# Patient Record
Sex: FEMALE | Race: WHITE | NOT HISPANIC OR LATINO | Employment: OTHER | URBAN - NONMETROPOLITAN AREA
[De-identification: names, ages, dates, MRNs, and addresses within clinical notes are randomized per-mention and may not be internally consistent; named-entity substitution may affect disease eponyms.]

---

## 2017-01-04 DIAGNOSIS — N39.0 URINARY TRACT INFECTION, SITE UNSPECIFIED: Primary | ICD-10-CM

## 2017-01-04 RX ORDER — NITROFURANTOIN 25; 75 MG/1; MG/1
100 CAPSULE ORAL 2 TIMES DAILY
Qty: 20 CAPSULE | Refills: 0 | Status: SHIPPED | OUTPATIENT
Start: 2017-01-04 | End: 2017-01-14

## 2017-01-13 ENCOUNTER — TRANSCRIBE ORDERS (OUTPATIENT)
Dept: ADMINISTRATIVE | Facility: HOSPITAL | Age: 72
End: 2017-01-13

## 2017-01-13 DIAGNOSIS — E27.8 LEFT ADRENAL MASS (HCC): Primary | ICD-10-CM

## 2017-01-23 ENCOUNTER — APPOINTMENT (OUTPATIENT)
Dept: ULTRASOUND IMAGING | Facility: HOSPITAL | Age: 72
End: 2017-01-23
Attending: FAMILY MEDICINE

## 2017-01-25 ENCOUNTER — OFFICE VISIT (OUTPATIENT)
Dept: UROLOGY | Facility: CLINIC | Age: 72
End: 2017-01-25

## 2017-01-25 DIAGNOSIS — N39.0 URINARY TRACT INFECTION, SITE UNSPECIFIED: ICD-10-CM

## 2017-01-25 DIAGNOSIS — N30.00 ACUTE CYSTITIS WITHOUT HEMATURIA: ICD-10-CM

## 2017-01-25 DIAGNOSIS — N39.0 URINARY TRACT INFECTION WITHOUT HEMATURIA, SITE UNSPECIFIED: Primary | ICD-10-CM

## 2017-01-25 LAB
BILIRUB BLD-MCNC: NEGATIVE MG/DL
CLARITY, POC: CLEAR
COLOR UR: YELLOW
GLUCOSE UR STRIP-MCNC: ABNORMAL MG/DL
KETONES UR QL: NEGATIVE
LEUKOCYTE EST, POC: NEGATIVE
NITRITE UR-MCNC: NEGATIVE MG/ML
PH UR: 5 [PH] (ref 5–8)
PROT UR STRIP-MCNC: NEGATIVE MG/DL
RBC # UR STRIP: NEGATIVE /UL
SP GR UR: 1.02 (ref 1–1.03)
UROBILINOGEN UR QL: NORMAL

## 2017-01-25 PROCEDURE — 81003 URINALYSIS AUTO W/O SCOPE: CPT | Performed by: UROLOGY

## 2017-01-25 PROCEDURE — 99213 OFFICE O/P EST LOW 20 MIN: CPT | Performed by: UROLOGY

## 2017-01-25 RX ORDER — NITROFURANTOIN 25; 75 MG/1; MG/1
100 CAPSULE ORAL DAILY
Qty: 60 CAPSULE | Refills: 0 | Status: SHIPPED | OUTPATIENT
Start: 2017-01-25 | End: 2017-03-26

## 2017-01-25 RX ORDER — PHENAZOPYRIDINE HYDROCHLORIDE 200 MG/1
200 TABLET, FILM COATED ORAL 3 TIMES DAILY PRN
Qty: 21 TABLET | Refills: 0 | Status: SHIPPED | OUTPATIENT
Start: 2017-01-25 | End: 2017-10-12

## 2017-01-25 NOTE — PROGRESS NOTES
Chief Complaint:          Chief Complaint   Patient presents with   • Urinary Tract Infection       HPI:   71 y.o. female.  71-year-old white female who been referred with an abnormal CAT scan and basically had a 1.3 cm adrenal nodule of no clinical significance and a small Bosniak one cyst.  I did a cystoscopy because of recurrent bladder infections and I found a very irritated inflamed bladder placed her on prophylaxis.  She now returns for follow-up she still having the burning but her repeat urinalysis is completely negative I'm going to have her stay on Macrodantin daily.  Additionally, her insurance does not pay for Pyridium so she's forced to use over-the-counter products        Past Medical History:        Past Medical History   Diagnosis Date   • Anemia    • COPD (chronic obstructive pulmonary disease)    • Diabetes mellitus    • GERD (gastroesophageal reflux disease)    • Hypertension    • Myocardial infarction    • Neuropathy in diabetes    • Osteoarthritis          Current Meds:     Current Outpatient Prescriptions   Medication Sig Dispense Refill   • atorvastatin (LIPITOR) 10 MG tablet Take 10 mg by mouth Daily.     • budesonide-formoterol (SYMBICORT) 160-4.5 MCG/ACT inhaler Inhale 2 puffs 2 (Two) Times a Day.     • cyclobenzaprine (FLEXERIL) 10 MG tablet Take 10 mg by mouth 2 (Two) Times a Day As Needed for muscle spasms.     • diclofenac (VOLTAREN) 1 % gel gel Apply 2 g topically 2 (Two) Times a Day. TO THE WRIST,   4G TO KNEE TWO TIMES A DAY.     • docusate calcium (SURFAK) 240 MG capsule Take 1 capsule by mouth Daily. 30 capsule 0   • fluticasone (FLONASE) 50 MCG/ACT nasal spray 1 spray into each nostril Daily. ONE SPRAY IN EACH NOSTRIL.     • gabapentin (NEURONTIN) 300 MG capsule Take 300 mg by mouth 3 (Three) Times a Day.     • hydrochlorothiazide (HYDRODIURIL) 25 MG tablet Take 25 mg by mouth Daily.     • HYDROcodone-acetaminophen (NORCO) 7.5-325 MG per tablet Take 1 tablet by mouth 2 (Two) Times  a Day.     • HYDROcodone-ibuprofen (VICOPROFEN) 7.5-200 MG per tablet Take 1 tablet by mouth.     • insulin glargine (LANTUS) 100 UNIT/ML injection Inject 60 Units under the skin Every Night.     • linaclotide (LINZESS) 290 MCG capsule capsule Take 145 mcg by mouth Every Morning Before Breakfast.     • metFORMIN (GLUCOPHAGE) 1000 MG tablet Take 1,000 mg by mouth 2 (Two) Times a Day With Meals.     • metoprolol succinate XL (TOPROL-XL) 50 MG 24 hr tablet Take 1 tablet by mouth 2 (Two) Times a Day. 30 tablet 0   • montelukast (SINGULAIR) 10 MG tablet Take 10 mg by mouth Daily.     • nystatin (MYCOSTATIN) 369831 UNIT/ML suspension Swish and swallow 500,000 Units 3 (Three) Times a Day.     • omeprazole (PriLOSEC) 20 MG capsule Take 20 mg by mouth Daily.     • phenazopyridine (PYRIDIUM) 200 MG tablet Take 1 tablet by mouth 3 (Three) Times a Day As Needed for bladder spasms. 21 tablet 0   • potassium chloride (K-DUR) 10 MEQ CR tablet Take 10 mEq by mouth Daily.     • zolpidem (AMBIEN) 10 MG tablet Take 10 mg by mouth At Night As Needed for sleep.       No current facility-administered medications for this visit.         Allergies:      Allergies   Allergen Reactions   • Clonidine Derivatives Shortness Of Breath        Past Surgical History:     Past Surgical History   Procedure Laterality Date   • Cholecystectomy     • Laparoscopic tubal ligation       vaginally   • Hysterectomy       partial         Social History:     Social History     Social History   • Marital status:      Spouse name: N/A   • Number of children: N/A   • Years of education: N/A     Occupational History   • Not on file.     Social History Main Topics   • Smoking status: Current Every Day Smoker     Packs/day: 0.50     Years: 50.00     Types: Cigarettes   • Smokeless tobacco: Not on file   • Alcohol use No   • Drug use: No   • Sexual activity: Not on file     Other Topics Concern   • Not on file     Social History Narrative       Family History:      Family History   Problem Relation Age of Onset   • Diabetes Mother    • Hypertension Mother    • Diabetes Maternal Grandmother    • Diabetes Maternal Grandfather        Review of Systems:     Review of Systems   Constitutional: Negative.  Negative for activity change, appetite change, chills, diaphoresis, fatigue and unexpected weight change.   HENT: Negative for congestion, dental problem, drooling, ear discharge, ear pain, facial swelling, hearing loss, mouth sores, nosebleeds, postnasal drip, rhinorrhea, sinus pressure, sneezing, sore throat, tinnitus, trouble swallowing and voice change.    Eyes: Negative.  Negative for photophobia, pain, discharge, redness, itching and visual disturbance.   Respiratory: Negative.  Negative for apnea, cough, choking, chest tightness, shortness of breath, wheezing and stridor.    Cardiovascular: Negative.  Negative for chest pain, palpitations and leg swelling.   Gastrointestinal: Negative.  Negative for abdominal distention, abdominal pain, anal bleeding, blood in stool, constipation, diarrhea, nausea, rectal pain and vomiting.   Endocrine: Negative.  Negative for cold intolerance, heat intolerance, polydipsia, polyphagia and polyuria.   Genitourinary: Negative.  Negative for difficulty urinating, dyspareunia, dysuria and genital sores.   Musculoskeletal: Negative.  Negative for arthralgias, back pain, gait problem, joint swelling, myalgias, neck pain and neck stiffness.   Skin: Negative.  Negative for color change, pallor, rash and wound.   Allergic/Immunologic: Negative.  Negative for environmental allergies, food allergies and immunocompromised state.   Neurological: Negative.  Negative for dizziness, tremors, seizures, syncope, facial asymmetry, speech difficulty, weakness, light-headedness, numbness and headaches.   Hematological: Negative.  Negative for adenopathy. Does not bruise/bleed easily.   Psychiatric/Behavioral: Negative for agitation, behavioral problems,  confusion, decreased concentration, dysphoric mood, hallucinations, self-injury, sleep disturbance and suicidal ideas. The patient is not nervous/anxious and is not hyperactive.    All other systems reviewed and are negative.      Physical Exam:     Physical Exam   Constitutional: She appears well-developed and well-nourished.   HENT:   Head: Normocephalic and atraumatic.   Right Ear: External ear normal.   Left Ear: External ear normal.   Mouth/Throat: Oropharynx is clear and moist.   Eyes: Conjunctivae are normal. Pupils are equal, round, and reactive to light.   Cardiovascular: Normal rate, regular rhythm, normal heart sounds and intact distal pulses.    Pulmonary/Chest: Effort normal and breath sounds normal.   Abdominal: Soft. Bowel sounds are normal. She exhibits no distension and no mass. There is no tenderness. There is no rebound and no guarding.   Genitourinary: No vaginal discharge found.   Musculoskeletal: Normal range of motion.   Neurological: She is alert. She has normal reflexes.   Skin: Skin is warm and dry.   Psychiatric: She has a normal mood and affect. Her behavior is normal. Judgment and thought content normal.       Procedure:       Assessment:   No diagnosis found.  No orders of the defined types were placed in this encounter.      Plan:   Urinary tract infection continue prophylaxis recheck in 1 month           This document has been electronically signed by SHAN PIERCE MD January 25, 2017 9:56 AM

## 2017-01-25 NOTE — MR AVS SNAPSHOT
Carlos Salazar   1/25/2017 10:00 AM   Office Visit    Dept Phone:  [phone]   Encounter #:  [id number]    Provider:  Brandin Yuen MD   Department:  [facility] UROLOGY                Your Full Care Plan              Your Updated Medication List          This list is accurate as of: 1/25/17 10:12 AM.  Always use your most recent med list.                AMBIEN 10 MG tablet   Generic drug:  zolpidem       atorvastatin 10 MG tablet   Commonly known as:  LIPITOR       budesonide-formoterol 160-4.5 MCG/ACT inhaler   Commonly known as:  SYMBICORT       cyclobenzaprine 10 MG tablet   Commonly known as:  FLEXERIL       diclofenac 1 % gel gel   Commonly known as:  VOLTAREN       docusate calcium 240 MG capsule   Commonly known as:  SURFAK   Take 1 capsule by mouth Daily.       fluticasone 50 MCG/ACT nasal spray   Commonly known as:  FLONASE       gabapentin 300 MG capsule   Commonly known as:  NEURONTIN       hydrochlorothiazide 25 MG tablet   Commonly known as:  HYDRODIURIL       HYDROcodone-acetaminophen 7.5-325 MG per tablet   Commonly known as:  NORCO       HYDROcodone-ibuprofen 7.5-200 MG per tablet   Commonly known as:  VICOPROFEN       insulin glargine 100 UNIT/ML injection   Commonly known as:  LANTUS       linaclotide 290 MCG capsule capsule   Commonly known as:  LINZESS       metFORMIN 1000 MG tablet   Commonly known as:  GLUCOPHAGE       metoprolol succinate XL 50 MG 24 hr tablet   Commonly known as:  TOPROL-XL   Take 1 tablet by mouth 2 (Two) Times a Day.       montelukast 10 MG tablet   Commonly known as:  SINGULAIR       nystatin 060889 UNIT/ML suspension   Commonly known as:  MYCOSTATIN       omeprazole 20 MG capsule   Commonly known as:  priLOSEC       phenazopyridine 200 MG tablet   Commonly known as:  PYRIDIUM   Take 1 tablet by mouth 3 (Three) Times a Day As Needed for bladder spasms.       potassium chloride 10 MEQ CR tablet   Commonly known as:   K-DUR               We Performed the Following     POC Urinalysis Dipstick, Automated       You Were Diagnosed With        Codes Comments    Urinary tract infection without hematuria, site unspecified    -  Primary ICD-10-CM: N39.0  ICD-9-CM: 599.0     Urinary tract infection, site unspecified     ICD-10-CM: N39.0       Instructions     None    Patient Instructions History      Upcoming Appointments     Visit Type Date Time Department    FOLLOW UP 2017 10:00 AM E UROLOGY [facility]    US COR RENAL BILATERAL 2017  3:00 PM  COR US [facility]    FOLLOW UP 2017  9:30 AM [facility]    FOLLOW UP 2017  1:30 PM [facility]      [facility] [place] Signup     [facility] Coup[employer] Wallet allows you to send messages to your doctor, view your test results, renew your prescriptions, schedule appointments, and more. To sign up, go to [employer] and click on the Sign Up Now link in the New User? box. Enter your Coupon Wallet Activation Code exactly as it appears below along with the last four digits of your Social Security Number and your Date of Birth () to complete the sign-up process. If you do not sign up before the expiration date, you must request a new code.    Coupon Wallet Activation Code: [email or web address]  Expires: 2017 10:12 AM    If you have questions, you can email [email or web address] or call [phone] to talk to our Coup[employer] Wallet staff. Remember, Coupon Wallet is NOT to be used for urgent needs. For medical emergencies, dial 911.               Other Info from Your Visit           Your Appointments     2017  3:00 PM EST   US cor renal bilateral with COR SOUTH US 1   [facility] IMAGING ULTRASOUND ([facility])    [address]  [facility] [address]   [phone]           No prep. Arrive 15 minutes before your appointment. Bring a current list of medications.            2017  9:30 AM EST   Follow Up with Brandin Yuen MD   [facility]  UROLOGY (--)    [address] [place] [address]   [phone]           Arrive 15 minutes prior to appointment.            Feb 22, 2017  1:30 PM EST   Follow Up with Brandin Yuen MD   [facility] UROLOGY (--)    [address] [place] [address] [phone]           Arrive 15 minutes prior to appointment.              Allergies     Clonidine Derivatives Allergy Shortness Of Breath      Reason for Visit     Urinary Tract Infection           Vital Signs     Smoking Status                   Current Every Day Smoker           Problems and Diagnoses Noted     Urinary tract infection without hematuria, site unspecified    -  Primary    Urinary tract infection          Results     POC Urinalysis Dipstick, Automated      Component Value Standard Range & Units    Color Yellow Yellow, Straw, Dark Yellow, Mendy    Clarity, UA Clear Clear    Glucose,  mg/dL Negative, 1000 mg/dL (3+) mg/dL    Bilirubin Negative Negative    Ketones, UA Negative Negative    Specific Gravity  1.020 1.005 - 1.030    Blood, UA Negative Negative    pH, Urine 5.0 5.0 - 8.0    Protein, POC Negative Negative mg/dL    Urobilinogen, UA Normal Normal    Leukocytes Negative Negative    Nitrite, UA Negative Negative

## 2017-01-31 ENCOUNTER — APPOINTMENT (OUTPATIENT)
Dept: ULTRASOUND IMAGING | Facility: HOSPITAL | Age: 72
End: 2017-01-31
Attending: FAMILY MEDICINE

## 2017-02-22 ENCOUNTER — OFFICE VISIT (OUTPATIENT)
Dept: UROLOGY | Facility: CLINIC | Age: 72
End: 2017-02-22

## 2017-02-22 DIAGNOSIS — N30.00 ACUTE CYSTITIS WITHOUT HEMATURIA: ICD-10-CM

## 2017-02-22 DIAGNOSIS — N39.0 URINARY TRACT INFECTION WITHOUT HEMATURIA, SITE UNSPECIFIED: Primary | ICD-10-CM

## 2017-02-22 LAB
BILIRUB BLD-MCNC: NEGATIVE MG/DL
CLARITY, POC: CLEAR
COLOR UR: YELLOW
GLUCOSE UR STRIP-MCNC: ABNORMAL MG/DL
KETONES UR QL: ABNORMAL
LEUKOCYTE EST, POC: NEGATIVE
NITRITE UR-MCNC: NEGATIVE MG/ML
PH UR: 5 [PH] (ref 5–8)
PROT UR STRIP-MCNC: NEGATIVE MG/DL
RBC # UR STRIP: NEGATIVE /UL
SP GR UR: 1.03 (ref 1–1.03)
UROBILINOGEN UR QL: NORMAL

## 2017-02-22 PROCEDURE — 99213 OFFICE O/P EST LOW 20 MIN: CPT | Performed by: UROLOGY

## 2017-02-22 PROCEDURE — 81003 URINALYSIS AUTO W/O SCOPE: CPT | Performed by: UROLOGY

## 2017-02-22 NOTE — PROGRESS NOTES
Chief Complaint:          Chief Complaint   Patient presents with   • Urinary Tract Infection       HPI:   71 y.o. female.  Sameer [age]-year-old white female here for follow-up she has a known chronic cystitis is been on prophylactic Macrodantin she's had no further problems.  She cannot afford Pyridium.  She uses over-the-counter bladder analgesia her urine is negative other than a significantly elevated glucose I'm and have her continue this for 6 months and recommend a repeat cystoscopy        Past Medical History:        Past Medical History   Diagnosis Date   • Anemia    • COPD (chronic obstructive pulmonary disease)    • Diabetes mellitus    • GERD (gastroesophageal reflux disease)    • Hypertension    • Myocardial infarction    • Neuropathy in diabetes    • Osteoarthritis          Current Meds:     Current Outpatient Prescriptions   Medication Sig Dispense Refill   • atorvastatin (LIPITOR) 10 MG tablet Take 10 mg by mouth Daily.     • budesonide-formoterol (SYMBICORT) 160-4.5 MCG/ACT inhaler Inhale 2 puffs 2 (Two) Times a Day.     • cyclobenzaprine (FLEXERIL) 10 MG tablet Take 10 mg by mouth 2 (Two) Times a Day As Needed for muscle spasms.     • diclofenac (VOLTAREN) 1 % gel gel Apply 2 g topically 2 (Two) Times a Day. TO THE WRIST,   4G TO KNEE TWO TIMES A DAY.     • docusate calcium (SURFAK) 240 MG capsule Take 1 capsule by mouth Daily. 30 capsule 0   • fluticasone (FLONASE) 50 MCG/ACT nasal spray 1 spray into each nostril Daily. ONE SPRAY IN EACH NOSTRIL.     • gabapentin (NEURONTIN) 300 MG capsule Take 300 mg by mouth 3 (Three) Times a Day.     • hydrochlorothiazide (HYDRODIURIL) 25 MG tablet Take 25 mg by mouth Daily.     • HYDROcodone-acetaminophen (NORCO) 7.5-325 MG per tablet Take 1 tablet by mouth 2 (Two) Times a Day.     • HYDROcodone-ibuprofen (VICOPROFEN) 7.5-200 MG per tablet Take 1 tablet by mouth.     • insulin glargine (LANTUS) 100 UNIT/ML injection Inject 60 Units under the skin Every Night.      • linaclotide (LINZESS) 290 MCG capsule capsule Take 145 mcg by mouth Every Morning Before Breakfast.     • metFORMIN (GLUCOPHAGE) 1000 MG tablet Take 1,000 mg by mouth 2 (Two) Times a Day With Meals.     • metoprolol succinate XL (TOPROL-XL) 50 MG 24 hr tablet Take 1 tablet by mouth 2 (Two) Times a Day. 30 tablet 0   • montelukast (SINGULAIR) 10 MG tablet Take 10 mg by mouth Daily.     • nitrofurantoin, macrocrystal-monohydrate, (MACROBID) 100 MG capsule Take 1 capsule by mouth Daily for 60 days. 60 capsule 0   • nystatin (MYCOSTATIN) 894570 UNIT/ML suspension Swish and swallow 500,000 Units 3 (Three) Times a Day.     • omeprazole (PriLOSEC) 20 MG capsule Take 20 mg by mouth Daily.     • phenazopyridine (PYRIDIUM) 200 MG tablet Take 1 tablet by mouth 3 (Three) Times a Day As Needed for bladder spasms. 21 tablet 0   • potassium chloride (K-DUR) 10 MEQ CR tablet Take 10 mEq by mouth Daily.     • zolpidem (AMBIEN) 10 MG tablet Take 10 mg by mouth At Night As Needed for sleep.       No current facility-administered medications for this visit.         Allergies:      Allergies   Allergen Reactions   • Clonidine Derivatives Shortness Of Breath        Past Surgical History:     Past Surgical History   Procedure Laterality Date   • Cholecystectomy     • Laparoscopic tubal ligation       vaginally   • Hysterectomy       partial         Social History:     Social History     Social History   • Marital status:      Spouse name: N/A   • Number of children: N/A   • Years of education: N/A     Occupational History   • Not on file.     Social History Main Topics   • Smoking status: Current Every Day Smoker     Packs/day: 0.50     Years: 50.00     Types: Cigarettes   • Smokeless tobacco: Not on file   • Alcohol use No   • Drug use: No   • Sexual activity: Not on file     Other Topics Concern   • Not on file     Social History Narrative       Family History:     Family History   Problem Relation Age of Onset   • Diabetes  Mother    • Hypertension Mother    • Diabetes Maternal Grandmother    • Diabetes Maternal Grandfather        Review of Systems:     Review of Systems   Constitutional: Negative.  Negative for activity change, appetite change, chills, diaphoresis, fatigue and unexpected weight change.   HENT: Negative for congestion, dental problem, drooling, ear discharge, ear pain, facial swelling, hearing loss, mouth sores, nosebleeds, postnasal drip, rhinorrhea, sinus pressure, sneezing, sore throat, tinnitus, trouble swallowing and voice change.    Eyes: Negative.  Negative for photophobia, pain, discharge, redness, itching and visual disturbance.   Respiratory: Negative.  Negative for apnea, cough, choking, chest tightness, shortness of breath, wheezing and stridor.    Cardiovascular: Negative.  Negative for chest pain, palpitations and leg swelling.   Gastrointestinal: Negative.  Negative for abdominal distention, abdominal pain, anal bleeding, blood in stool, constipation, diarrhea, nausea, rectal pain and vomiting.   Endocrine: Negative.  Negative for cold intolerance, heat intolerance, polydipsia, polyphagia and polyuria.   Genitourinary: Negative.  Negative for difficulty urinating, dyspareunia, dysuria and genital sores.   Musculoskeletal: Negative.  Negative for arthralgias, back pain, gait problem, joint swelling, myalgias, neck pain and neck stiffness.   Skin: Negative.  Negative for color change, pallor, rash and wound.   Allergic/Immunologic: Negative.  Negative for environmental allergies, food allergies and immunocompromised state.   Neurological: Negative.  Negative for dizziness, tremors, seizures, syncope, facial asymmetry, speech difficulty, weakness, light-headedness, numbness and headaches.   Hematological: Negative.  Negative for adenopathy. Does not bruise/bleed easily.   Psychiatric/Behavioral: Negative for agitation, behavioral problems, confusion, decreased concentration, dysphoric mood, hallucinations,  self-injury, sleep disturbance and suicidal ideas. The patient is not nervous/anxious and is not hyperactive.    All other systems reviewed and are negative.      Physical Exam:     Physical Exam   Constitutional: She appears well-developed and well-nourished.   HENT:   Head: Normocephalic and atraumatic.   Right Ear: External ear normal.   Left Ear: External ear normal.   Mouth/Throat: Oropharynx is clear and moist.   Eyes: Conjunctivae are normal. Pupils are equal, round, and reactive to light.   Cardiovascular: Normal rate, regular rhythm, normal heart sounds and intact distal pulses.    Pulmonary/Chest: Effort normal and breath sounds normal.   Abdominal: Soft. Bowel sounds are normal. She exhibits no distension and no mass. There is no tenderness. There is no rebound and no guarding.   Genitourinary: No vaginal discharge found.   Musculoskeletal: Normal range of motion.   Neurological: She is alert. She has normal reflexes.   Skin: Skin is warm and dry.   Psychiatric: She has a normal mood and affect. Her behavior is normal. Judgment and thought content normal.       Procedure:       Assessment:   No diagnosis found.  No orders of the defined types were placed in this encounter.      Plan:   Recurrent urinary tract infections-continue Macrodantin prophylaxis call should she breakthrough no other treatments at this juncture she needs a cystoscopy in 6 months           This document has been electronically signed by SHAN PIERCE MD February 22, 2017 9:33 AM

## 2017-06-15 ENCOUNTER — HOSPITAL ENCOUNTER (OUTPATIENT)
Dept: ULTRASOUND IMAGING | Facility: HOSPITAL | Age: 72
Discharge: HOME | End: 2017-06-15
Attending: FAMILY MEDICINE | Admitting: FAMILY MEDICINE

## 2017-06-15 DIAGNOSIS — E27.8 LEFT ADRENAL MASS (HCC): ICD-10-CM

## 2017-06-15 PROCEDURE — 76775 US EXAM ABDO BACK WALL LIM: CPT

## 2017-06-15 PROCEDURE — 76775 US EXAM ABDO BACK WALL LIM: CPT | Performed by: RADIOLOGY

## 2017-10-11 ENCOUNTER — TRANSCRIBE ORDERS (OUTPATIENT)
Dept: ADMINISTRATIVE | Facility: HOSPITAL | Age: 72
End: 2017-10-11

## 2017-10-11 DIAGNOSIS — R13.10 DYSPHAGIA, UNSPECIFIED TYPE: Primary | ICD-10-CM

## 2017-10-12 ENCOUNTER — TRANSCRIBE ORDERS (OUTPATIENT)
Dept: INFUSION THERAPY | Facility: HOSPITAL | Age: 72
End: 2017-10-12

## 2017-10-12 ENCOUNTER — HOSPITAL ENCOUNTER (OUTPATIENT)
Dept: INFUSION THERAPY | Facility: HOSPITAL | Age: 72
Setting detail: INFUSION SERIES
Discharge: HOME | End: 2017-10-12
Attending: INTERNAL MEDICINE

## 2017-10-12 VITALS
SYSTOLIC BLOOD PRESSURE: 133 MMHG | TEMPERATURE: 98.1 F | HEART RATE: 67 BPM | BODY MASS INDEX: 35.3 KG/M2 | WEIGHT: 187 LBS | DIASTOLIC BLOOD PRESSURE: 64 MMHG | HEIGHT: 61 IN | RESPIRATION RATE: 17 BRPM

## 2017-10-12 DIAGNOSIS — K90.9 INTESTINAL MALABSORPTION, UNSPECIFIED TYPE: ICD-10-CM

## 2017-10-12 DIAGNOSIS — D50.9 IRON DEFICIENCY ANEMIA, UNSPECIFIED IRON DEFICIENCY ANEMIA TYPE: ICD-10-CM

## 2017-10-12 DIAGNOSIS — D50.9 IRON DEFICIENCY ANEMIA, UNSPECIFIED IRON DEFICIENCY ANEMIA TYPE: Primary | ICD-10-CM

## 2017-10-12 PROCEDURE — 25010000002 FERRIC CARBOXYMALTOSE 750 MG/15ML SOLUTION 15 ML VIAL: Performed by: INTERNAL MEDICINE

## 2017-10-12 PROCEDURE — 96365 THER/PROPH/DIAG IV INF INIT: CPT

## 2017-10-12 RX ADMIN — FERRIC CARBOXYMALTOSE INJECTION 750 MG: 50 INJECTION, SOLUTION INTRAVENOUS at 15:55

## 2017-10-12 NOTE — PATIENT INSTRUCTIONS
Ferric carboxymaltose injection  What is this medicine?  FERRIC CARBOXYMALTOSE (ferr-ik car-box-ee-mol-toes) is an iron complex. Iron is used to make healthy red blood cells, which carry oxygen and nutrients throughout the body. This medicine is used to treat anemia in people with chronic kidney disease or people who cannot take iron by mouth.  This medicine may be used for other purposes; ask your health care provider or pharmacist if you have questions.  COMMON BRAND NAME(S): Injectafer  What should I tell my health care provider before I take this medicine?  They need to know if you have any of these conditions:  -anemia not caused by low iron levels  -high levels of iron in the blood  -liver disease  -an unusual or allergic reaction to iron, other medicines, foods, dyes, or preservatives  -pregnant or trying to get pregnant  -breast-feeding  How should I use this medicine?  This medicine is for infusion into a vein. It is given by a health care professional in a hospital or clinic setting.  Talk to your pediatrician regarding the use of this medicine in children. Special care may be needed.  Overdosage: If you think you have taken too much of this medicine contact a poison control center or emergency room at once.  NOTE: This medicine is only for you. Do not share this medicine with others.  What if I miss a dose?  It is important not to miss your dose. Call your doctor or health care professional if you are unable to keep an appointment.  What may interact with this medicine?  Do not take this medicine with any of the following medications:  -deferoxamine  -dimercaprol  -other iron products  This medicine may also interact with the following medications:  -chloramphenicol  -deferasirox  This list may not describe all possible interactions. Give your health care provider a list of all the medicines, herbs, non-prescription drugs, or dietary supplements you use. Also tell them if you smoke, drink alcohol, or use  illegal drugs. Some items may interact with your medicine.  What should I watch for while using this medicine?  Visit your doctor or health care professional regularly. Tell your doctor if your symptoms do not start to get better or if they get worse. You may need blood work done while you are taking this medicine.  You may need to follow a special diet. Talk to your doctor. Foods that contain iron include: whole grains/cereals, dried fruits, beans, or peas, leafy green vegetables, and organ meats (liver, kidney).  What side effects may I notice from receiving this medicine?  Side effects that you should report to your doctor or health care professional as soon as possible:  -allergic reactions like skin rash, itching or hives, swelling of the face, lips, or tongue  -breathing problems  -changes in blood pressure  -feeling faint or lightheaded, falls  -flushing, sweating, or hot feelings  Side effects that usually do not require medical attention (report to your doctor or health care professional if they continue or are bothersome):  -changes in taste  -constipation  -dizziness  -headache  -nausea  -pain, redness, or irritation at site where injected  -vomiting  This list may not describe all possible side effects. Call your doctor for medical advice about side effects. You may report side effects to FDA at 4-537-FDA-6595.  Where should I keep my medicine?  This drug is given in a hospital or clinic and will not be stored at home.  NOTE: This sheet is a summary. It may not cover all possible information. If you have questions about this medicine, talk to your doctor, pharmacist, or health care provider.     © 2017, [employer]. (2017-01-19 11:20:47)

## 2017-10-13 ENCOUNTER — HOSPITAL ENCOUNTER (OUTPATIENT)
Dept: GENERAL RADIOLOGY | Facility: HOSPITAL | Age: 72
Discharge: HOME | End: 2017-10-13
Admitting: NURSE PRACTITIONER

## 2017-10-13 DIAGNOSIS — R13.10 DYSPHAGIA, UNSPECIFIED TYPE: ICD-10-CM

## 2017-10-13 PROCEDURE — 74220 X-RAY XM ESOPHAGUS 1CNTRST: CPT | Performed by: RADIOLOGY

## 2017-10-13 PROCEDURE — 74220 X-RAY XM ESOPHAGUS 1CNTRST: CPT

## 2017-10-19 ENCOUNTER — HOSPITAL ENCOUNTER (OUTPATIENT)
Dept: INFUSION THERAPY | Facility: HOSPITAL | Age: 72
Setting detail: INFUSION SERIES
Discharge: HOME | End: 2017-10-19
Attending: INTERNAL MEDICINE

## 2017-10-19 VITALS
SYSTOLIC BLOOD PRESSURE: 147 MMHG | DIASTOLIC BLOOD PRESSURE: 56 MMHG | RESPIRATION RATE: 16 BRPM | TEMPERATURE: 98.8 F | HEART RATE: 72 BPM

## 2017-10-19 DIAGNOSIS — K90.9 INTESTINAL MALABSORPTION, UNSPECIFIED TYPE: ICD-10-CM

## 2017-10-19 DIAGNOSIS — D50.9 IRON DEFICIENCY ANEMIA, UNSPECIFIED IRON DEFICIENCY ANEMIA TYPE: ICD-10-CM

## 2017-10-19 PROCEDURE — 96365 THER/PROPH/DIAG IV INF INIT: CPT

## 2017-10-19 PROCEDURE — 25010000002 FERRIC CARBOXYMALTOSE 750 MG/15ML SOLUTION 15 ML VIAL: Performed by: INTERNAL MEDICINE

## 2017-10-19 RX ADMIN — FERRIC CARBOXYMALTOSE INJECTION 750 MG: 50 INJECTION, SOLUTION INTRAVENOUS at 15:35

## 2017-10-19 NOTE — PATIENT INSTRUCTIONS
Ferric carboxymaltose injection  What is this medicine?  FERRIC CARBOXYMALTOSE (ferr-ik car-box-ee-mol-toes) is an iron complex. Iron is used to make healthy red blood cells, which carry oxygen and nutrients throughout the body. This medicine is used to treat anemia in people with chronic kidney disease or people who cannot take iron by mouth.  This medicine may be used for other purposes; ask your health care provider or pharmacist if you have questions.  COMMON BRAND NAME(S): Injectafer  What should I tell my health care provider before I take this medicine?  They need to know if you have any of these conditions:  -anemia not caused by low iron levels  -high levels of iron in the blood  -liver disease  -an unusual or allergic reaction to iron, other medicines, foods, dyes, or preservatives  -pregnant or trying to get pregnant  -breast-feeding  How should I use this medicine?  This medicine is for infusion into a vein. It is given by a health care professional in a hospital or clinic setting.  Talk to your pediatrician regarding the use of this medicine in children. Special care may be needed.  Overdosage: If you think you have taken too much of this medicine contact a poison control center or emergency room at once.  NOTE: This medicine is only for you. Do not share this medicine with others.  What if I miss a dose?  It is important not to miss your dose. Call your doctor or health care professional if you are unable to keep an appointment.  What may interact with this medicine?  Do not take this medicine with any of the following medications:  -deferoxamine  -dimercaprol  -other iron products  This medicine may also interact with the following medications:  -chloramphenicol  -deferasirox  This list may not describe all possible interactions. Give your health care provider a list of all the medicines, herbs, non-prescription drugs, or dietary supplements you use. Also tell them if you smoke, drink alcohol, or use  illegal drugs. Some items may interact with your medicine.  What should I watch for while using this medicine?  Visit your doctor or health care professional regularly. Tell your doctor if your symptoms do not start to get better or if they get worse. You may need blood work done while you are taking this medicine.  You may need to follow a special diet. Talk to your doctor. Foods that contain iron include: whole grains/cereals, dried fruits, beans, or peas, leafy green vegetables, and organ meats (liver, kidney).  What side effects may I notice from receiving this medicine?  Side effects that you should report to your doctor or health care professional as soon as possible:  -allergic reactions like skin rash, itching or hives, swelling of the face, lips, or tongue  -breathing problems  -changes in blood pressure  -feeling faint or lightheaded, falls  -flushing, sweating, or hot feelings  Side effects that usually do not require medical attention (report to your doctor or health care professional if they continue or are bothersome):  -changes in taste  -constipation  -dizziness  -headache  -nausea  -pain, redness, or irritation at site where injected  -vomiting  This list may not describe all possible side effects. Call your doctor for medical advice about side effects. You may report side effects to FDA at 0-505-FDA-2330.  Where should I keep my medicine?  This drug is given in a hospital or clinic and will not be stored at home.  NOTE: This sheet is a summary. It may not cover all possible information. If you have questions about this medicine, talk to your doctor, pharmacist, or health care provider.     © 2017, [employer]. (2017-01-19 11:20:47)

## 2017-11-14 ENCOUNTER — TRANSCRIBE ORDERS (OUTPATIENT)
Dept: ADMINISTRATIVE | Facility: HOSPITAL | Age: 72
End: 2017-11-14

## 2017-11-14 DIAGNOSIS — Z12.31 VISIT FOR SCREENING MAMMOGRAM: Primary | ICD-10-CM

## 2017-12-04 ENCOUNTER — APPOINTMENT (OUTPATIENT)
Dept: MAMMOGRAPHY | Facility: HOSPITAL | Age: 72
End: 2017-12-04
Attending: FAMILY MEDICINE

## 2018-01-02 ENCOUNTER — HOSPITAL ENCOUNTER (OUTPATIENT)
Dept: MAMMOGRAPHY | Facility: HOSPITAL | Age: 73
Discharge: HOME | End: 2018-01-02
Attending: FAMILY MEDICINE

## 2018-01-18 ENCOUNTER — APPOINTMENT (OUTPATIENT)
Dept: MAMMOGRAPHY | Facility: HOSPITAL | Age: 73
End: 2018-01-18
Attending: FAMILY MEDICINE

## 2018-02-09 ENCOUNTER — HOSPITAL ENCOUNTER (OUTPATIENT)
Dept: MAMMOGRAPHY | Facility: HOSPITAL | Age: 73
End: 2018-02-09
Attending: FAMILY MEDICINE

## 2018-02-12 ENCOUNTER — TRANSCRIBE ORDERS (OUTPATIENT)
Dept: ADMINISTRATIVE | Facility: HOSPITAL | Age: 73
End: 2018-02-12

## 2018-02-12 DIAGNOSIS — Z12.31 VISIT FOR SCREENING MAMMOGRAM: Primary | ICD-10-CM

## 2018-05-15 ENCOUNTER — HOSPITAL ENCOUNTER (OUTPATIENT)
Dept: MAMMOGRAPHY | Facility: HOSPITAL | Age: 73
Discharge: HOME | End: 2018-05-15
Admitting: FAMILY MEDICINE

## 2018-05-15 DIAGNOSIS — Z12.39 BREAST CANCER SCREENING: ICD-10-CM

## 2018-05-15 PROCEDURE — 77067 SCR MAMMO BI INCL CAD: CPT

## 2018-05-15 PROCEDURE — 77067 SCR MAMMO BI INCL CAD: CPT | Performed by: RADIOLOGY

## 2018-05-15 PROCEDURE — 77063 BREAST TOMOSYNTHESIS BI: CPT | Performed by: RADIOLOGY

## 2018-05-15 PROCEDURE — 77063 BREAST TOMOSYNTHESIS BI: CPT

## 2018-08-16 ENCOUNTER — TRANSCRIBE ORDERS (OUTPATIENT)
Dept: INFUSION THERAPY | Facility: HOSPITAL | Age: 73
End: 2018-08-16

## 2018-08-16 DIAGNOSIS — K90.9 INTESTINAL MALABSORPTION, UNSPECIFIED TYPE: ICD-10-CM

## 2018-08-16 DIAGNOSIS — D50.9 IRON DEFICIENCY ANEMIA, UNSPECIFIED IRON DEFICIENCY ANEMIA TYPE: Primary | ICD-10-CM

## 2018-08-20 ENCOUNTER — HOSPITAL ENCOUNTER (OUTPATIENT)
Dept: INFUSION THERAPY | Facility: HOSPITAL | Age: 73
Setting detail: INFUSION SERIES
Discharge: HOME | End: 2018-08-20
Attending: INTERNAL MEDICINE

## 2018-08-20 VITALS
DIASTOLIC BLOOD PRESSURE: 46 MMHG | BODY MASS INDEX: 33.86 KG/M2 | WEIGHT: 184 LBS | RESPIRATION RATE: 18 BRPM | SYSTOLIC BLOOD PRESSURE: 131 MMHG | HEIGHT: 62 IN | TEMPERATURE: 98.5 F | HEART RATE: 76 BPM

## 2018-08-20 DIAGNOSIS — K90.9 INTESTINAL MALABSORPTION, UNSPECIFIED TYPE: ICD-10-CM

## 2018-08-20 DIAGNOSIS — D50.9 IRON DEFICIENCY ANEMIA, UNSPECIFIED IRON DEFICIENCY ANEMIA TYPE: ICD-10-CM

## 2018-08-20 PROCEDURE — 96365 THER/PROPH/DIAG IV INF INIT: CPT

## 2018-08-20 PROCEDURE — 25010000002 FERRIC CARBOXYMALTOSE 750 MG/15ML SOLUTION 15 ML VIAL: Performed by: INTERNAL MEDICINE

## 2018-08-20 RX ADMIN — FERRIC CARBOXYMALTOSE INJECTION 750 MG: 50 INJECTION, SOLUTION INTRAVENOUS at 14:22

## 2018-08-20 NOTE — PATIENT INSTRUCTIONS
Ferric carboxymaltose injection  What is this medicine?  FERRIC CARBOXYMALTOSE (ferr-ik car-box-ee-mol-toes) is an iron complex. Iron is used to make healthy red blood cells, which carry oxygen and nutrients throughout the body. This medicine is used to treat anemia in people with chronic kidney disease or people who cannot take iron by mouth.  This medicine may be used for other purposes; ask your health care provider or pharmacist if you have questions.  COMMON BRAND NAME(S): Injectafer  What should I tell my health care provider before I take this medicine?  They need to know if you have any of these conditions:  -anemia not caused by low iron levels  -high levels of iron in the blood  -liver disease  -an unusual or allergic reaction to iron, other medicines, foods, dyes, or preservatives  -pregnant or trying to get pregnant  -breast-feeding  How should I use this medicine?  This medicine is for infusion into a vein. It is given by a health care professional in a hospital or clinic setting.  Talk to your pediatrician regarding the use of this medicine in children. Special care may be needed.  Overdosage: If you think you have taken too much of this medicine contact a poison control center or emergency room at once.  NOTE: This medicine is only for you. Do not share this medicine with others.  What if I miss a dose?  It is important not to miss your dose. Call your doctor or health care professional if you are unable to keep an appointment.  What may interact with this medicine?  Do not take this medicine with any of the following medications:  -deferoxamine  -dimercaprol  -other iron products  This medicine may also interact with the following medications:  -chloramphenicol  -deferasirox  This list may not describe all possible interactions. Give your health care provider a list of all the medicines, herbs, non-prescription drugs, or dietary supplements you use. Also tell them if you smoke, drink alcohol, or use  illegal drugs. Some items may interact with your medicine.  What should I watch for while using this medicine?  Visit your doctor or health care professional regularly. Tell your doctor if your symptoms do not start to get better or if they get worse. You may need blood work done while you are taking this medicine.  You may need to follow a special diet. Talk to your doctor. Foods that contain iron include: whole grains/cereals, dried fruits, beans, or peas, leafy green vegetables, and organ meats (liver, kidney).  What side effects may I notice from receiving this medicine?  Side effects that you should report to your doctor or health care professional as soon as possible:  -allergic reactions like skin rash, itching or hives, swelling of the face, lips, or tongue  -breathing problems  -changes in blood pressure  -feeling faint or lightheaded, falls  -flushing, sweating, or hot feelings  Side effects that usually do not require medical attention (report to your doctor or health care professional if they continue or are bothersome):  -changes in taste  -constipation  -dizziness  -headache  -nausea  -pain, redness, or irritation at site where injected  -vomiting  This list may not describe all possible side effects. Call your doctor for medical advice about side effects. You may report side effects to FDA at 5-444-FDA-3256.  Where should I keep my medicine?  This drug is given in a hospital or clinic and will not be stored at home.  NOTE: This sheet is a summary. It may not cover all possible information. If you have questions about this medicine, talk to your doctor, pharmacist, or health care provider.  © 2018 [employer] (2017-01-19 11:20:47)

## 2018-08-27 ENCOUNTER — HOSPITAL ENCOUNTER (OUTPATIENT)
Dept: INFUSION THERAPY | Facility: HOSPITAL | Age: 73
Setting detail: INFUSION SERIES
Discharge: HOME | End: 2018-08-27
Attending: INTERNAL MEDICINE

## 2018-08-27 VITALS
HEART RATE: 67 BPM | RESPIRATION RATE: 18 BRPM | TEMPERATURE: 98.6 F | DIASTOLIC BLOOD PRESSURE: 66 MMHG | SYSTOLIC BLOOD PRESSURE: 132 MMHG

## 2018-08-27 DIAGNOSIS — K90.9 INTESTINAL MALABSORPTION, UNSPECIFIED TYPE: ICD-10-CM

## 2018-08-27 DIAGNOSIS — D50.9 IRON DEFICIENCY ANEMIA, UNSPECIFIED IRON DEFICIENCY ANEMIA TYPE: ICD-10-CM

## 2018-08-27 PROCEDURE — 25010000002 FERRIC CARBOXYMALTOSE 750 MG/15ML SOLUTION 15 ML VIAL: Performed by: INTERNAL MEDICINE

## 2018-08-27 PROCEDURE — 96365 THER/PROPH/DIAG IV INF INIT: CPT

## 2018-08-27 RX ADMIN — FERRIC CARBOXYMALTOSE INJECTION 750 MG: 50 INJECTION, SOLUTION INTRAVENOUS at 14:32

## 2019-10-15 ENCOUNTER — APPOINTMENT (OUTPATIENT)
Dept: MAMMOGRAPHY | Facility: HOSPITAL | Age: 74
End: 2019-10-15

## 2020-03-20 ENCOUNTER — APPOINTMENT (OUTPATIENT)
Dept: MAMMOGRAPHY | Facility: HOSPITAL | Age: 75
End: 2020-03-20

## 2020-06-01 ENCOUNTER — APPOINTMENT (OUTPATIENT)
Dept: MAMMOGRAPHY | Facility: HOSPITAL | Age: 75
End: 2020-06-01

## 2020-06-11 DIAGNOSIS — M25.561 RIGHT KNEE PAIN, UNSPECIFIED CHRONICITY: Primary | ICD-10-CM

## 2020-06-17 ENCOUNTER — HOSPITAL ENCOUNTER (OUTPATIENT)
Dept: GENERAL RADIOLOGY | Facility: HOSPITAL | Age: 75
Discharge: HOME | End: 2020-06-17
Admitting: ORTHOPAEDIC SURGERY

## 2020-06-17 ENCOUNTER — OFFICE VISIT (OUTPATIENT)
Dept: ORTHOPEDIC SURGERY | Facility: CLINIC | Age: 75
End: 2020-06-17

## 2020-06-17 VITALS
SYSTOLIC BLOOD PRESSURE: 154 MMHG | TEMPERATURE: 97.7 F | DIASTOLIC BLOOD PRESSURE: 66 MMHG | HEIGHT: 62 IN | WEIGHT: 190 LBS | HEART RATE: 83 BPM | BODY MASS INDEX: 34.96 KG/M2

## 2020-06-17 DIAGNOSIS — M25.561 RIGHT KNEE PAIN, UNSPECIFIED CHRONICITY: ICD-10-CM

## 2020-06-17 DIAGNOSIS — M17.11 PRIMARY OSTEOARTHRITIS OF RIGHT KNEE: Primary | ICD-10-CM

## 2020-06-17 PROCEDURE — 99203 OFFICE O/P NEW LOW 30 MIN: CPT | Performed by: ORTHOPAEDIC SURGERY

## 2020-06-17 PROCEDURE — 20610 DRAIN/INJ JOINT/BURSA W/O US: CPT | Performed by: ORTHOPAEDIC SURGERY

## 2020-06-17 PROCEDURE — 73562 X-RAY EXAM OF KNEE 3: CPT

## 2020-06-17 PROCEDURE — 73562 X-RAY EXAM OF KNEE 3: CPT | Performed by: RADIOLOGY

## 2020-06-17 RX ADMIN — LIDOCAINE HYDROCHLORIDE 5 ML: 20 INJECTION, SOLUTION INFILTRATION; PERINEURAL at 10:58

## 2020-06-17 NOTE — PROGRESS NOTES
New Patient Visit      Patient: Carlos Salazar  YOB: 1945  Date of Encounter: 06/17/2020        Chief Complaint:   Chief Complaint   Patient presents with   • Right Knee - Pain, Edema, Initial Evaluation           HPI:   Carlos Salazar, 74 y.o. female, referred by Martin Aguilera MD presents for evaluation of right knee pain no known injury she describes constant pain over the past 5 years she was scheduled for total knee arthroplasty in [place] but was identified to have hemoglobin currently low and it was canceled she is decided not to proceed with total knee replacement she is questioning possible injections to her right knee.  Reports occasional giving way sensation to her knee.  She denies locking.  She has had no trauma recent or remote.        Active Problem List:  Patient Active Problem List   Diagnosis   • Intestinal malabsorption   • Fe deficiency anemia   • Acute cystitis without hematuria     Past Medical History:   Diagnosis Date   • Anemia    • COPD (chronic obstructive pulmonary disease) (CMS/[facility])    • Diabetes mellitus (CMS/[facility])    • Ear infection    • GERD (gastroesophageal reflux disease)    • Hypertension    • Myocardial infarction (CMS/[facility])    • Neuropathy in diabetes (CMS/[facility])    • Osteoarthritis      Social History     Socioeconomic History   • Marital status:      Spouse name: Not on file   • Number of children: Not on file   • Years of education: Not on file   • Highest education level: Not on file   Tobacco Use   • Smoking status: Current Every Day Smoker     Packs/day: 0.50     Years: 50.00     Pack years: 25.00     Types: Cigarettes   • Smokeless tobacco: Never Used   Substance and Sexual Activity   • Alcohol use: No   • Drug use: No   • Sexual activity: Defer     Family History   Problem Relation Age of Onset   • Diabetes Mother    • Hypertension Mother    • Diabetes Maternal Grandmother    • Diabetes Maternal Grandfather    • Breast cancer Neg Hx      Past Surgical  History:   Procedure Laterality Date   • CHOLECYSTECTOMY     • HYSTERECTOMY      partial   • LAPAROSCOPIC TUBAL LIGATION      vaginally           Body mass index is 34.74 kg/m².      Medications:  Current Outpatient Medications   Medication Sig Dispense Refill   • budesonide-formoterol (SYMBICORT) 160-4.5 MCG/ACT inhaler Inhale 2 puffs 2 (Two) Times a Day.     • cyclobenzaprine (FLEXERIL) 10 MG tablet Take 10 mg by mouth 2 (Two) Times a Day As Needed for muscle spasms.     • gabapentin (NEURONTIN) 300 MG capsule Take 400 mg by mouth 3 (Three) Times a Day.     • hydrochlorothiazide (HYDRODIURIL) 25 MG tablet Take 25 mg by mouth Daily.     • HYDROcodone-acetaminophen (NORCO) 7.5-325 MG per tablet Take 1 tablet by mouth 2 (Two) Times a Day.     • insulin glargine (LANTUS) 100 UNIT/ML injection Inject 60 Units under the skin Every Night.     • metFORMIN (GLUCOPHAGE) 1000 MG tablet Take 1,000 mg by mouth 2 (Two) Times a Day With Meals.     • metoprolol succinate XL (TOPROL-XL) 50 MG 24 hr tablet Take 1 tablet by mouth 2 (Two) Times a Day. 30 tablet 0   • montelukast (SINGULAIR) 10 MG tablet Take 10 mg by mouth Daily.     • nystatin (MYCOSTATIN) 307231 UNIT/ML suspension Swish and swallow 500,000 Units 3 (Three) Times a Day.     • omeprazole (PriLOSEC) 20 MG capsule Take 20 mg by mouth Daily.     • potassium chloride (K-DUR) 10 MEQ CR tablet Take 10 mEq by mouth Daily.     • zolpidem (AMBIEN) 10 MG tablet Take 10 mg by mouth At Night As Needed for sleep.     • diclofenac (VOLTAREN) 1 % gel gel Apply 2 g topically 2 (Two) Times a Day. TO THE WRIST,   4G TO KNEE TWO TIMES A DAY.     • docusate calcium (SURFAK) 240 MG capsule Take 1 capsule by mouth Daily. 30 capsule 0   • fluticasone (FLONASE) 50 MCG/ACT nasal spray 1 spray into each nostril Daily. ONE SPRAY IN EACH NOSTRIL.     • HYDROcodone-ibuprofen (VICOPROFEN) 7.5-200 MG per tablet Take 1 tablet by mouth.       No current facility-administered medications for this  "visit.            Allergies:  Allergies   Allergen Reactions   • Clonidine Derivatives Shortness Of Breath           Review of Systems:   Review of Systems   Constitutional: Negative.    HENT: Negative.    Eyes: Negative.    Respiratory: Negative.    Cardiovascular: Negative.    Gastrointestinal: Negative.    Endocrine: Negative.    Genitourinary: Negative.    Musculoskeletal: Positive for arthralgias.   Skin: Negative.    Allergic/Immunologic: Negative.    Neurological: Negative.    Hematological: Negative.    Psychiatric/Behavioral: Negative.            Physical Exam:   Physical Exam  GENERAL: 74 y.o. female, alert and oriented X 3 in no acute distress.   Visit Vitals  /66   Pulse 83   Temp 97.7 °F (36.5 °C)   Ht 157.5 cm (62.01\")   Wt 86.2 kg (190 lb)   BMI 34.74 kg/m²         Musculoskeletal:   Examination right knee reveals mild effusion marked medial joint line tenderness flexion contracture of 10 degrees with further flexion to 110 degrees no gross instability normal patellar tracking neurovascular grossly intact.        Radiology/Labs:     Xr Knee 3 View Right    Result Date: 6/17/2020  Advanced changes of osteoarthritis in the right knee.  This report was finalized on 6/17/2020 3:34 PM by Dr. Ethan France II, MD.            Assessment & Plan:   74 y.o. female presents with fairly advanced osteoarthritis right knee with varus deformity flexion contracture and complete loss of joint space medial compartment.  I encouraged her to reconsider the possibility of total knee arthroplasty.  As a temporary measure she is given Zilretta intra-articular with lidocaine block as she is diabetic and has had difficulty with glucose elevation with steroid injections in the past.  She will seen in the future as needed regarding her right knee.        ICD-10-CM ICD-9-CM   1. Primary osteoarthritis of right knee M17.11 715.16         Large Joint Arthrocentesis: R knee  Date/Time: 6/17/2020 10:58 AM  Consent given by: " patient  Site marked: site marked  Timeout: Immediately prior to procedure a time out was called to verify the correct patient, procedure, equipment, support staff and site/side marked as required   Supporting Documentation  Indications: pain   Procedure Details  Location: knee - R knee  Preparation: Patient was prepped and draped in the usual sterile fashion  Needle size: 20 G  Approach: anterolateral  Medications administered: 32 mg Triamcinolone Acetonide 32 MG; 5 mL lidocaine 2%  Patient tolerance: patient tolerated the procedure well with no immediate complications            Cc:   Martin Aguilera MD                This document has been electronically signed by Js Howard MD   June 18, 2020 10:56

## 2020-06-22 RX ORDER — LIDOCAINE HYDROCHLORIDE 20 MG/ML
5 INJECTION, SOLUTION INFILTRATION; PERINEURAL
Status: COMPLETED | OUTPATIENT
Start: 2020-06-17 | End: 2020-06-17

## 2020-07-01 ENCOUNTER — OFFICE VISIT (OUTPATIENT)
Dept: ORTHOPEDIC SURGERY | Facility: CLINIC | Age: 75
End: 2020-07-01

## 2020-07-01 VITALS — WEIGHT: 184.3 LBS | BODY MASS INDEX: 33.92 KG/M2 | HEIGHT: 62 IN | TEMPERATURE: 96.8 F

## 2020-07-01 DIAGNOSIS — M17.11 PRIMARY OSTEOARTHRITIS OF RIGHT KNEE: Primary | ICD-10-CM

## 2020-07-01 DIAGNOSIS — I25.2 HISTORY OF MI (MYOCARDIAL INFARCTION): ICD-10-CM

## 2020-07-01 PROCEDURE — 99213 OFFICE O/P EST LOW 20 MIN: CPT | Performed by: ORTHOPAEDIC SURGERY

## 2020-07-01 RX ORDER — AMOXICILLIN 875 MG/1
TABLET, COATED ORAL
COMMUNITY
Start: 2020-06-24 | End: 2020-07-21

## 2020-07-01 NOTE — PROGRESS NOTES
Follow-up Visit         Patient: Carlos Salazar  YOB: 1945  Date of Encounter: 07/01/2020      Chief  Complaint:   Chief Complaint   Patient presents with   • Right Knee - Pain, Follow-up           HPI:  Carlos Salazar, 74 y.o. female presents in follow-up with her right knee complaints.  She last received Zilretta injection June 17 of 2020.  She reports relief for about a week.  Her symptoms are now returning.  She was actually scheduled for total knee replacement about 5 years ago but canceled this as she had low hemoglobin.  She now wishes to discuss the possibility of knee replacement surgery.  Her medical history is remarkable for diabetes, history of coronary artery disease with MI, COPD and iron deficiency anemia.        Medical History:  Patient Active Problem List   Diagnosis   • Intestinal malabsorption   • Fe deficiency anemia   • Acute cystitis without hematuria     Past Medical History:   Diagnosis Date   • Anemia    • COPD (chronic obstructive pulmonary disease) (CMS/HCC)    • Diabetes mellitus (CMS/HCC)    • Ear infection    • GERD (gastroesophageal reflux disease)    • Hypertension    • Myocardial infarction (CMS/HCC)    • Neuropathy in diabetes (CMS/[facility])    • Osteoarthritis            Social History:  Social History     Socioeconomic History   • Marital status:      Spouse name: Not on file   • Number of children: Not on file   • Years of education: Not on file   • Highest education level: Not on file   Tobacco Use   • Smoking status: Current Every Day Smoker     Packs/day: 0.50     Years: 50.00     Pack years: 25.00     Types: Cigarettes   • Smokeless tobacco: Never Used   Substance and Sexual Activity   • Alcohol use: No   • Drug use: No   • Sexual activity: Defer           Surgical History:  Past Surgical History:   Procedure Laterality Date   • CHOLECYSTECTOMY     • HYSTERECTOMY      partial   • LAPAROSCOPIC TUBAL LIGATION      vaginally           Radiology:   Xr Knee 3  View Right    Result Date: 6/17/2020  Advanced changes of osteoarthritis in the right knee.  This report was finalized on 6/17/2020 3:34 PM by Dr. Ethan France II, MD.            Examination:   Examination right knee reveals moderate effusion tenderness along the medial joint line.  She lacks 5 degrees extension flexes to 110 degrees no gross instability neurovascular is grossly intact.        Assessment & Plan:   74 y.o. female presents with primary complaint of right knee pain with longstanding osteoarthritis scheduled for total knee arthroplasty 5 years ago.  We did steroid injection his last attempt to avoid surgery she realizes now that to receive relief she will likely require total knee replacement.  We will send her for cardiac and medical clearance today she will return once she has been cleared and we will schedule.         Diagnosis Plan   1. Primary osteoarthritis of right knee  Ambulatory Referral to Cardiology   2. History of MI (myocardial infarction)  Ambulatory Referral to Cardiology                 Cc:  Martin Aguilera MD              This document has been electronically signed by Js Howard MD   July 6, 2020 16:00

## 2020-07-20 ENCOUNTER — APPOINTMENT (OUTPATIENT)
Dept: MAMMOGRAPHY | Facility: HOSPITAL | Age: 75
End: 2020-07-20

## 2020-07-21 ENCOUNTER — OFFICE VISIT (OUTPATIENT)
Dept: CARDIOLOGY | Facility: CLINIC | Age: 75
End: 2020-07-21

## 2020-07-21 VITALS
HEART RATE: 73 BPM | SYSTOLIC BLOOD PRESSURE: 143 MMHG | OXYGEN SATURATION: 96 % | BODY MASS INDEX: 34.04 KG/M2 | TEMPERATURE: 98 F | WEIGHT: 185 LBS | DIASTOLIC BLOOD PRESSURE: 62 MMHG | HEIGHT: 62 IN

## 2020-07-21 DIAGNOSIS — R07.2 PRECORDIAL PAIN: ICD-10-CM

## 2020-07-21 DIAGNOSIS — E78.2 MIXED HYPERLIPIDEMIA: ICD-10-CM

## 2020-07-21 DIAGNOSIS — F17.210 CIGARETTE SMOKER: ICD-10-CM

## 2020-07-21 DIAGNOSIS — Z01.810 PRE-OPERATIVE CARDIOVASCULAR EXAMINATION: Primary | ICD-10-CM

## 2020-07-21 DIAGNOSIS — I10 ESSENTIAL HYPERTENSION: ICD-10-CM

## 2020-07-21 DIAGNOSIS — R06.02 SHORTNESS OF BREATH: ICD-10-CM

## 2020-07-21 PROCEDURE — 99203 OFFICE O/P NEW LOW 30 MIN: CPT | Performed by: SPECIALIST

## 2020-07-21 PROCEDURE — 93000 ELECTROCARDIOGRAM COMPLETE: CPT | Performed by: SPECIALIST

## 2020-07-21 NOTE — PROGRESS NOTES
Subjective   Initial consultation, preoperative risk assessment  Carlos Salazar is a 75 y.o. female who presents to day for Establish Care (Pt was referred by Dr. Martin Aguilera. She is also needing a clearance for knee surgery. ); Med Management (med list. ); Pre-op Exam (Pt is needing to be cleared for a right total knee arthroplasty. . ); Palpitations; and Edema.    CHIEF COMPLIANT  Chief Complaint   Patient presents with   • Establish Care     Pt was referred by Dr. Martin Aguilera. She is also needing a clearance for knee surgery.    • Med Management     med list.    • Pre-op Exam     Pt is needing to be cleared for a right total knee arthroplasty. .    • Palpitations   • Edema       Active Problems:  Problem List Items Addressed This Visit        Cardiovascular and Mediastinum    Essential hypertension    Mixed hyperlipidemia       Respiratory    Shortness of breath       Nervous and Auditory    Precordial pain       Other    Pre-operative cardiovascular examination - Primary    Cigarette smoker          HPI  HPI  She will need right knee replacement she is very sedentary because of her knee pain she does have however some chest discomfort while she is doing things such as swimming the floor or mopping doing the laundry usually she will stop doing whatever she does and which will go away a few minutes it does not radiate anywhere and usually last for few minutes also she gets her shortness of breath with housework she did have some edema of the lower extremity but she was started on furosemide and this has much improved since patient she gets palpitations but is not very often no orthopnea or PND no syncope no dizziness now she smokes about half a pack a day issue used to smoke 2 packs/day she has hypertension hyperlipidemia her hypertension was recently not well controlled and she is also diabetic no cardiac family history according to her  PRIOR MEDS  Current Outpatient Medications on File  Prior to Visit   Medication Sig Dispense Refill   • budesonide-formoterol (SYMBICORT) 160-4.5 MCG/ACT inhaler Inhale 2 puffs 2 (Two) Times a Day.     • cyclobenzaprine (FLEXERIL) 10 MG tablet Take 10 mg by mouth 2 (Two) Times a Day As Needed for muscle spasms.     • diclofenac (VOLTAREN) 1 % gel gel Apply 2 g topically 2 (Two) Times a Day. TO THE WRIST,   4G TO KNEE TWO TIMES A DAY.     • docusate calcium (SURFAK) 240 MG capsule Take 1 capsule by mouth Daily. 30 capsule 0   • fluticasone (FLONASE) 50 MCG/ACT nasal spray 1 spray into each nostril Daily. ONE SPRAY IN EACH NOSTRIL.     • gabapentin (NEURONTIN) 300 MG capsule Take 400 mg by mouth 3 (Three) Times a Day.     • hydrochlorothiazide (HYDRODIURIL) 25 MG tablet Take 25 mg by mouth Daily.     • HYDROcodone-acetaminophen (NORCO) 7.5-325 MG per tablet Take 1 tablet by mouth 2 (Two) Times a Day.     • HYDROcodone-ibuprofen (VICOPROFEN) 7.5-200 MG per tablet Take 1 tablet by mouth.     • insulin glargine (LANTUS) 100 UNIT/ML injection Inject 60 Units under the skin Every Night.     • metFORMIN (GLUCOPHAGE) 1000 MG tablet Take 1,000 mg by mouth 2 (Two) Times a Day With Meals.     • metoprolol succinate XL (TOPROL-XL) 50 MG 24 hr tablet Take 1 tablet by mouth 2 (Two) Times a Day. 30 tablet 0   • montelukast (SINGULAIR) 10 MG tablet Take 10 mg by mouth Daily.     • nystatin (MYCOSTATIN) 534651 UNIT/ML suspension Swish and swallow 500,000 Units 3 (Three) Times a Day.     • omeprazole (PriLOSEC) 20 MG capsule Take 20 mg by mouth Daily.     • potassium chloride (K-DUR) 10 MEQ CR tablet Take 10 mEq by mouth Daily.     • zolpidem (AMBIEN) 10 MG tablet Take 10 mg by mouth At Night As Needed for sleep.     • [DISCONTINUED] amoxicillin (AMOXIL) 875 MG tablet        No current facility-administered medications on file prior to visit.        ALLERGIES  Clonidine derivatives    HISTORY  Past Medical History:   Diagnosis Date   • Anemia    • COPD (chronic obstructive pulmonary  "disease) (CMS/[facility])    • Diabetes mellitus (CMS/[facility])    • Ear infection    • GERD (gastroesophageal reflux disease)    • Hypertension    • Myocardial infarction (CMS/[facility])    • Neuropathy in diabetes (CMS/[facility])    • Osteoarthritis        Social History     Socioeconomic History   • Marital status:      Spouse name: Not on file   • Number of children: Not on file   • Years of education: Not on file   • Highest education level: Not on file   Tobacco Use   • Smoking status: Current Every Day Smoker     Packs/day: 0.50     Years: 50.00     Pack years: 25.00     Types: Cigarettes   • Smokeless tobacco: Never Used   Substance and Sexual Activity   • Alcohol use: No   • Drug use: No   • Sexual activity: Defer       Family History   Problem Relation Age of Onset   • Diabetes Mother    • Hypertension Mother    • Diabetes Maternal Grandmother    • Diabetes Maternal Grandfather    • Breast cancer Neg Hx        Review of Systems   Constitutional: Positive for appetite change, fatigue and unexpected weight change.   HENT: Positive for hearing loss. Negative for congestion.    Eyes: Negative for discharge and itching.        Wears glasses.      Respiratory: Positive for shortness of breath and wheezing.    Gastrointestinal: Negative.  Negative for abdominal distention and abdominal pain.   Endocrine: Positive for cold intolerance and heat intolerance.   Genitourinary: Positive for frequency.   Musculoskeletal: Positive for back pain and joint swelling.   Skin: Negative for color change and pallor.   Allergic/Immunologic: Negative.  Negative for immunocompromised state.   Neurological: Positive for headaches.   Hematological: Negative.    Psychiatric/Behavioral: Negative for agitation and behavioral problems.        Memory loss.          Objective     VITALS: /62 (BP Location: Left arm, Patient Position: Sitting, Cuff Size: Adult)   Pulse 73   Temp 98 °F (36.7 °C) (Infrared)   Ht 157.5 cm (62\")   Wt 83.9 kg (185 lb)  "  SpO2 96%   BMI 33.84 kg/m²     LABS:   Lab Results (most recent)     None          IMAGING:   Xr Knee 3 View Right    Result Date: 6/17/2020  Advanced changes of osteoarthritis in the right knee.  This report was finalized on 6/17/2020 3:34 PM by Dr. Ethan France II, MD.        EXAM:  Physical Exam   Constitutional: She is oriented to person, place, and time. She appears well-developed and well-nourished.   HENT:   Head: Normocephalic and atraumatic.   Eyes: Pupils are equal, round, and reactive to light.   Neck: Neck supple. No JVD present. No thyromegaly present.   Cardiovascular: Normal rate, regular rhythm, normal heart sounds and intact distal pulses. Exam reveals no gallop and no friction rub.   No murmur heard.  Pulmonary/Chest: Effort normal and breath sounds normal. No stridor. No respiratory distress. She has no wheezes. She has no rales. She exhibits no tenderness.   Musculoskeletal: She exhibits no edema, tenderness or deformity.   Neurological: She is alert and oriented to person, place, and time. No cranial nerve deficit. Coordination normal.   Skin: Skin is warm and dry.   Psychiatric: She has a normal mood and affect.   Vitals reviewed.      Procedure     ECG 12 Lead  Date/Time: 7/21/2020 9:33 AM  Performed by: Alec Agrawal MD  Authorized by: Alec Agrawal MD           EKG: NSR, with nonspecific T wave changes, otherwise unremarkable EKG, No previous EKG for comparison       Assessment/Plan    Diagnosis Plan   1. Pre-operative cardiovascular examination     2. Precordial pain     3. Shortness of breath     4. Essential hypertension     5. Mixed hyperlipidemia     6. Cigarette smoker     1.  We will proceed with cardiac work-up for assessment of the risk  2.  She is having some exertional chest pain proceed with stress testing for assessment of ischemia  3.  Also significant shortness of breath we will get an echocardiogram to assess her cardiac function  4.  Her blood pressure is elevated  will monitor for now this is her first her first visit we may have to readjust her medications if it continues to be elevated  5.  She has hyperlipidemia we will try to get the lipid profile for assessment she said that she takes a statin but she cannot remember the name of which will try to bring it by the next visit  6.  Advised her in the strongest terms that she has to quit smoking but she is not willing to do that at this point    No follow-ups on file.    Carlos was seen today for establish care, med management, pre-op exam, palpitations and edema.    Diagnoses and all orders for this visit:    Pre-operative cardiovascular examination    Precordial pain    Shortness of breath    Essential hypertension    Mixed hyperlipidemia    Cigarette smoker    Other orders  -     ECG 12 Lead                   MEDS ORDERED DURING VISIT:  No orders of the defined types were placed in this encounter.        This document has been electronically signed by Alec Agrawal MD  July 21, 2020 10:05

## 2020-08-18 ENCOUNTER — HOSPITAL ENCOUNTER (OUTPATIENT)
Dept: NUCLEAR MEDICINE | Facility: HOSPITAL | Age: 75
End: 2020-08-18

## 2020-08-18 ENCOUNTER — HOSPITAL ENCOUNTER (OUTPATIENT)
Dept: CARDIOLOGY | Facility: HOSPITAL | Age: 75
Discharge: HOME | End: 2020-08-18

## 2020-08-18 ENCOUNTER — EPISODE CHANGES (OUTPATIENT)
Dept: CASE MANAGEMENT | Facility: OTHER | Age: 75
End: 2020-08-18

## 2020-08-18 ENCOUNTER — HOSPITAL ENCOUNTER (OUTPATIENT)
Dept: CARDIOLOGY | Facility: HOSPITAL | Age: 75
End: 2020-08-18

## 2020-08-18 ENCOUNTER — HOSPITAL ENCOUNTER (OUTPATIENT)
Dept: NUCLEAR MEDICINE | Facility: HOSPITAL | Age: 75
Discharge: HOME | End: 2020-08-18

## 2020-08-18 DIAGNOSIS — R07.2 PRECORDIAL PAIN: ICD-10-CM

## 2020-08-18 DIAGNOSIS — R06.02 SHORTNESS OF BREATH: ICD-10-CM

## 2020-08-25 ENCOUNTER — HOSPITAL ENCOUNTER (OUTPATIENT)
Dept: NUCLEAR MEDICINE | Facility: HOSPITAL | Age: 75
Discharge: HOME | End: 2020-08-25

## 2020-08-25 ENCOUNTER — HOSPITAL ENCOUNTER (OUTPATIENT)
Dept: CARDIOLOGY | Facility: HOSPITAL | Age: 75
Discharge: HOME | End: 2020-08-25

## 2020-08-25 LAB
BH CV ECHO MEAS - % IVS THICK: 57.3 %
BH CV ECHO MEAS - % LVPW THICK: 24.5 %
BH CV ECHO MEAS - ACS: 1.6 CM
BH CV ECHO MEAS - AI DEC SLOPE: 267 CM/SEC^2
BH CV ECHO MEAS - AI MAX PG: 78.9 MMHG
BH CV ECHO MEAS - AI MAX VEL: 444 CM/SEC
BH CV ECHO MEAS - AI P1/2T: 487.1 MSEC
BH CV ECHO MEAS - AO MAX PG: 8.6 MMHG
BH CV ECHO MEAS - AO MEAN PG: 5 MMHG
BH CV ECHO MEAS - AO ROOT AREA (BSA CORRECTED): 1.6
BH CV ECHO MEAS - AO ROOT AREA: 6.6 CM^2
BH CV ECHO MEAS - AO ROOT DIAM: 2.9 CM
BH CV ECHO MEAS - AO V2 MAX: 147 CM/SEC
BH CV ECHO MEAS - AO V2 MEAN: 107 CM/SEC
BH CV ECHO MEAS - AO V2 VTI: 36.5 CM
BH CV ECHO MEAS - BSA(HAYCOCK): 2 M^2
BH CV ECHO MEAS - BSA: 1.8 M^2
BH CV ECHO MEAS - BZI_BMI: 33.8 KILOGRAMS/M^2
BH CV ECHO MEAS - BZI_METRIC_HEIGHT: 157.5 CM
BH CV ECHO MEAS - BZI_METRIC_WEIGHT: 83.9 KG
BH CV ECHO MEAS - EDV(CUBED): 57.7 ML
BH CV ECHO MEAS - EDV(MOD-SP4): 42 ML
BH CV ECHO MEAS - EDV(TEICH): 64.5 ML
BH CV ECHO MEAS - EF(CUBED): 88.9 %
BH CV ECHO MEAS - EF(MOD-SP4): 83.5 %
BH CV ECHO MEAS - EF(TEICH): 83.6 %
BH CV ECHO MEAS - ESV(CUBED): 6.4 ML
BH CV ECHO MEAS - ESV(MOD-SP4): 6.9 ML
BH CV ECHO MEAS - ESV(TEICH): 10.6 ML
BH CV ECHO MEAS - FS: 51.9 %
BH CV ECHO MEAS - IVS/LVPW: 0.86
BH CV ECHO MEAS - IVSD: 1.3 CM
BH CV ECHO MEAS - IVSS: 2 CM
BH CV ECHO MEAS - LA DIMENSION: 3.4 CM
BH CV ECHO MEAS - LA/AO: 1.2
BH CV ECHO MEAS - LV DIASTOLIC VOL/BSA (35-75): 22.7 ML/M^2
BH CV ECHO MEAS - LV MASS(C)D: 191.8 GRAMS
BH CV ECHO MEAS - LV MASS(C)DI: 103.7 GRAMS/M^2
BH CV ECHO MEAS - LV MASS(C)S: 147.7 GRAMS
BH CV ECHO MEAS - LV MASS(C)SI: 79.9 GRAMS/M^2
BH CV ECHO MEAS - LV SYSTOLIC VOL/BSA (12-30): 3.7 ML/M^2
BH CV ECHO MEAS - LVIDD: 3.9 CM
BH CV ECHO MEAS - LVIDS: 1.9 CM
BH CV ECHO MEAS - LVLD AP4: 6.4 CM
BH CV ECHO MEAS - LVLS AP4: 5.4 CM
BH CV ECHO MEAS - LVOT AREA (M): 2.3 CM^2
BH CV ECHO MEAS - LVOT AREA: 2.3 CM^2
BH CV ECHO MEAS - LVOT DIAM: 1.7 CM
BH CV ECHO MEAS - LVPWD: 1.1 CM
BH CV ECHO MEAS - LVPWS: 1.8 CM
BH CV ECHO MEAS - MV A MAX VEL: 112 CM/SEC
BH CV ECHO MEAS - MV E MAX VEL: 117 CM/SEC
BH CV ECHO MEAS - MV E/A: 1
BH CV ECHO MEAS - PA ACC TIME: 0.11 SEC
BH CV ECHO MEAS - PA PR(ACCEL): 28.2 MMHG
BH CV ECHO MEAS - RAP SYSTOLE: 10 MMHG
BH CV ECHO MEAS - RVSP: 45 MMHG
BH CV ECHO MEAS - SI(AO): 130.4 ML/M^2
BH CV ECHO MEAS - SI(CUBED): 27.7 ML/M^2
BH CV ECHO MEAS - SI(MOD-SP4): 19 ML/M^2
BH CV ECHO MEAS - SI(TEICH): 29.2 ML/M^2
BH CV ECHO MEAS - SV(AO): 241.1 ML
BH CV ECHO MEAS - SV(CUBED): 51.3 ML
BH CV ECHO MEAS - SV(MOD-SP4): 35.1 ML
BH CV ECHO MEAS - SV(TEICH): 53.9 ML
BH CV ECHO MEAS - TR MAX VEL: 283.3 CM/SEC
BH CV NUCLEAR PRIOR STUDY: 3
BH CV STRESS BP STAGE 1: NORMAL
BH CV STRESS BP STAGE 2: NORMAL
BH CV STRESS COMMENTS STAGE 1: NORMAL
BH CV STRESS COMMENTS STAGE 2: NORMAL
BH CV STRESS DOSE REGADENOSON STAGE 1: 0.4
BH CV STRESS DURATION MIN STAGE 1: 0
BH CV STRESS DURATION MIN STAGE 2: 4
BH CV STRESS DURATION SEC STAGE 1: 10
BH CV STRESS DURATION SEC STAGE 2: 0
BH CV STRESS HR STAGE 1: 83
BH CV STRESS HR STAGE 2: 76
BH CV STRESS PROTOCOL 1: NORMAL
BH CV STRESS RECOVERY BP: NORMAL MMHG
BH CV STRESS RECOVERY HR: 76 BPM
BH CV STRESS STAGE 1: 1
BH CV STRESS STAGE 2: 2
LV EF NUC BP: 70 %
MAXIMAL PREDICTED HEART RATE: 146 BPM
MAXIMAL PREDICTED HEART RATE: 146 BPM
PERCENT MAX PREDICTED HR: 56.85 %
STRESS BASELINE BP: NORMAL MMHG
STRESS BASELINE HR: 69 BPM
STRESS PERCENT HR: 67 %
STRESS POST PEAK BP: NORMAL MMHG
STRESS POST PEAK HR: 83 BPM
STRESS TARGET HR: 124 BPM
STRESS TARGET HR: 124 BPM

## 2020-08-25 PROCEDURE — 93017 CV STRESS TEST TRACING ONLY: CPT

## 2020-08-25 PROCEDURE — 25010000002 REGADENOSON 0.4 MG/5ML SOLUTION: Performed by: SPECIALIST

## 2020-08-25 PROCEDURE — A9500 TC99M SESTAMIBI: HCPCS | Performed by: SPECIALIST

## 2020-08-25 PROCEDURE — 0 TECHNETIUM SESTAMIBI: Performed by: SPECIALIST

## 2020-08-25 PROCEDURE — 93018 CV STRESS TEST I&R ONLY: CPT | Performed by: SPECIALIST

## 2020-08-25 PROCEDURE — 93306 TTE W/DOPPLER COMPLETE: CPT

## 2020-08-25 PROCEDURE — 78452 HT MUSCLE IMAGE SPECT MULT: CPT

## 2020-08-25 PROCEDURE — 78452 HT MUSCLE IMAGE SPECT MULT: CPT | Performed by: SPECIALIST

## 2020-08-25 PROCEDURE — 93306 TTE W/DOPPLER COMPLETE: CPT | Performed by: SPECIALIST

## 2020-08-25 RX ADMIN — TECHNETIUM TC 99M SESTAMIBI 1 DOSE: 1 INJECTION INTRAVENOUS at 10:00

## 2020-08-25 RX ADMIN — TECHNETIUM TC 99M SESTAMIBI 1 DOSE: 1 INJECTION INTRAVENOUS at 08:40

## 2020-08-25 RX ADMIN — REGADENOSON 0.4 MG: 0.08 INJECTION, SOLUTION INTRAVENOUS at 10:00

## 2020-09-17 ENCOUNTER — TELEPHONE (OUTPATIENT)
Dept: ORTHOPEDIC SURGERY | Facility: CLINIC | Age: 75
End: 2020-09-17

## 2020-09-17 NOTE — TELEPHONE ENCOUNTER
Christelle from Dr. Aguilera's office called to say the patient wasn't cleared due to a1c being 10.6 until it is below 8.

## 2020-12-29 ENCOUNTER — TRANSCRIBE ORDERS (OUTPATIENT)
Dept: ADMINISTRATIVE | Facility: HOSPITAL | Age: 75
End: 2020-12-29

## 2020-12-29 DIAGNOSIS — Z01.818 OTHER SPECIFIED PRE-OPERATIVE EXAMINATION: Primary | ICD-10-CM

## 2021-01-02 ENCOUNTER — LAB (OUTPATIENT)
Dept: LAB | Facility: HOSPITAL | Age: 76
End: 2021-01-02

## 2021-01-02 DIAGNOSIS — Z01.818 OTHER SPECIFIED PRE-OPERATIVE EXAMINATION: ICD-10-CM

## 2021-01-02 PROCEDURE — C9803 HOPD COVID-19 SPEC COLLECT: HCPCS

## 2021-01-02 PROCEDURE — U0004 COV-19 TEST NON-CDC HGH THRU: HCPCS

## 2021-01-03 LAB — SARS-COV-2 RNA RESP QL NAA+PROBE: NOT DETECTED

## 2021-01-20 ENCOUNTER — TRANSCRIBE ORDERS (OUTPATIENT)
Dept: ADMINISTRATIVE | Facility: HOSPITAL | Age: 76
End: 2021-01-20

## 2021-01-20 DIAGNOSIS — Z01.818 PRE-OPERATIVE CLEARANCE: Primary | ICD-10-CM

## 2021-01-22 ENCOUNTER — LAB (OUTPATIENT)
Dept: LAB | Facility: HOSPITAL | Age: 76
End: 2021-01-22

## 2021-01-22 DIAGNOSIS — Z01.818 PRE-OPERATIVE CLEARANCE: ICD-10-CM

## 2021-01-22 PROCEDURE — C9803 HOPD COVID-19 SPEC COLLECT: HCPCS

## 2021-01-22 PROCEDURE — U0004 COV-19 TEST NON-CDC HGH THRU: HCPCS | Performed by: OPHTHALMOLOGY

## 2021-01-23 LAB — SARS-COV-2 RNA RESP QL NAA+PROBE: NOT DETECTED

## 2021-02-17 DIAGNOSIS — Z23 IMMUNIZATION DUE: ICD-10-CM

## 2021-05-05 ENCOUNTER — OFFICE VISIT (OUTPATIENT)
Dept: ORTHOPEDIC SURGERY | Facility: CLINIC | Age: 76
End: 2021-05-05

## 2021-05-05 VITALS
SYSTOLIC BLOOD PRESSURE: 135 MMHG | DIASTOLIC BLOOD PRESSURE: 63 MMHG | HEIGHT: 62 IN | BODY MASS INDEX: 35 KG/M2 | WEIGHT: 190.2 LBS | HEART RATE: 66 BPM | TEMPERATURE: 97.1 F

## 2021-05-05 DIAGNOSIS — M17.11 PRIMARY OSTEOARTHRITIS OF RIGHT KNEE: Primary | ICD-10-CM

## 2021-05-05 PROCEDURE — 99213 OFFICE O/P EST LOW 20 MIN: CPT | Performed by: ORTHOPAEDIC SURGERY

## 2021-05-05 NOTE — PROGRESS NOTES
Follow-up Visit         Patient: Carlos Salazar  YOB: 1945  Date of Encounter: 05/05/2021      Chief  Complaint:   Chief Complaint   Patient presents with   • Right Knee - Pain, Follow-up   • Knee Pain     Surgery update right total knee arthroplasty         HPI:  Carlos Salazar, 75 y.o. female presents in follow-up with known osteoarthritis right knee.  When last seen she was referred to cardiologist and her primary care provider for clearance.  Has been evaluated by cardiologist and stress test with echo ordered she has not yet return for follow-up and has not been cleared, she wishes to proceed with proposed surgery however.  Her current BMI is 34.7 with hemoglobin A1c of 7.7.      Medical History:  Patient Active Problem List   Diagnosis   • Intestinal malabsorption   • Fe deficiency anemia   • Acute cystitis without hematuria   • Pre-operative cardiovascular examination   • Precordial pain   • Shortness of breath   • Essential hypertension   • Mixed hyperlipidemia   • Cigarette smoker   • Primary osteoarthritis of right knee     Past Medical History:   Diagnosis Date   • Anemia    • COPD (chronic obstructive pulmonary disease) (CMS/[facility])    • Diabetes mellitus (CMS/[facility])    • Ear infection    • GERD (gastroesophageal reflux disease)    • Hypertension    • Myocardial infarction (CMS/[facility])    • Neuropathy in diabetes (CMS/[facility])    • Osteoarthritis          Social History:  Social History     Socioeconomic History   • Marital status:      Spouse name: Not on file   • Number of children: Not on file   • Years of education: Not on file   • Highest education level: Not on file   Tobacco Use   • Smoking status: Current Every Day Smoker     Packs/day: 0.50     Years: 50.00     Pack years: 25.00     Types: Cigarettes   • Smokeless tobacco: Never Used   Substance and Sexual Activity   • Alcohol use: No   • Drug use: No   • Sexual activity: Defer         Current Medications:    Current Outpatient  Medications:   •  budesonide-formoterol (SYMBICORT) 160-4.5 MCG/ACT inhaler, Inhale 2 puffs 2 (Two) Times a Day., Disp: , Rfl:   •  cyclobenzaprine (FLEXERIL) 10 MG tablet, Take 10 mg by mouth 2 (Two) Times a Day As Needed for muscle spasms., Disp: , Rfl:   •  diclofenac (VOLTAREN) 1 % gel gel, Apply 2 g topically 2 (Two) Times a Day. TO THE WRIST,  4G TO KNEE TWO TIMES A DAY., Disp: , Rfl:   •  docusate calcium (SURFAK) 240 MG capsule, Take 1 capsule by mouth Daily., Disp: 30 capsule, Rfl: 0  •  fluticasone (FLONASE) 50 MCG/ACT nasal spray, 1 spray into each nostril Daily. ONE SPRAY IN EACH NOSTRIL., Disp: , Rfl:   •  gabapentin (NEURONTIN) 300 MG capsule, Take 400 mg by mouth 3 (Three) Times a Day., Disp: , Rfl:   •  hydrochlorothiazide (HYDRODIURIL) 25 MG tablet, Take 25 mg by mouth Daily., Disp: , Rfl:   •  HYDROcodone-acetaminophen (NORCO) 7.5-325 MG per tablet, Take 1 tablet by mouth 2 (Two) Times a Day., Disp: , Rfl:   •  HYDROcodone-ibuprofen (VICOPROFEN) 7.5-200 MG per tablet, Take 1 tablet by mouth., Disp: , Rfl:   •  insulin glargine (LANTUS) 100 UNIT/ML injection, Inject 60 Units under the skin Every Night., Disp: , Rfl:   •  metFORMIN (GLUCOPHAGE) 1000 MG tablet, Take 1,000 mg by mouth 2 (Two) Times a Day With Meals., Disp: , Rfl:   •  metoprolol succinate XL (TOPROL-XL) 50 MG 24 hr tablet, Take 1 tablet by mouth 2 (Two) Times a Day., Disp: 30 tablet, Rfl: 0  •  montelukast (SINGULAIR) 10 MG tablet, Take 10 mg by mouth Daily., Disp: , Rfl:   •  nystatin (MYCOSTATIN) 036642 UNIT/ML suspension, Swish and swallow 500,000 Units 3 (Three) Times a Day., Disp: , Rfl:   •  omeprazole (PriLOSEC) 20 MG capsule, Take 20 mg by mouth Daily., Disp: , Rfl:   •  potassium chloride (K-DUR) 10 MEQ CR tablet, Take 10 mEq by mouth Daily., Disp: , Rfl:   •  zolpidem (AMBIEN) 10 MG tablet, Take 10 mg by mouth At Night As Needed for sleep., Disp: , Rfl:       Allergies:  Allergies   Allergen Reactions   • Clonidine  Derivatives Shortness Of Breath         Family History:  Family History   Problem Relation Age of Onset   • Diabetes Mother    • Hypertension Mother    • Diabetes Maternal Grandmother    • Diabetes Maternal Grandfather    • Breast cancer Neg Hx          Surgical History:  Past Surgical History:   Procedure Laterality Date   • CHOLECYSTECTOMY     • HYSTERECTOMY      partial   • LAPAROSCOPIC TUBAL LIGATION      vaginally       Radiology:  Previous radiographs right knee weightbearing show advanced tricompartmental osteoarthritis.      Examination:   Examination right knee demonstrates 5 degree flexion contracture.  She has further flexion to 90 degrees.  She has no gross instability with varus valgus stressing and no gross deformity.  Demonstrates moderate medial joint line tenderness with mild effusion.  Neurovascular exam grossly intact.        Assessment & Plan:   75 y.o. female presents in follow-up again wishing to proceed with right total knee replacement.  She is scheduled to see her cardiologist Ca 3 of 2021 provided clearance is attained we will proceed with proposed surgery however she will need to return for evaluation and surgical scheduling.         Diagnosis Plan   1. Primary osteoarthritis of right knee                 Cc:  Martin Aguilera MD Saleh, Khaled, MD            This document has been electronically signed by Js Howard MD   May 7, 2021 09:11 EDT

## 2021-05-05 NOTE — PROGRESS NOTES
History and Physical      Patient: Carlos Salazar  YOB: 1945  Date of Encounter: 05/05/2021      Chief Complaint:   Chief Complaint   Patient presents with   • Right Knee - Pain, Follow-up   • Knee Pain     Surgery update right total knee arthroplasty           HPI:   Carlos Salazar, 75 y.o. female, presents         Active Problem List:  Patient Active Problem List   Diagnosis   • Intestinal malabsorption   • Fe deficiency anemia   • Acute cystitis without hematuria   • Pre-operative cardiovascular examination   • Precordial pain   • Shortness of breath   • Essential hypertension   • Mixed hyperlipidemia   • Cigarette smoker           Past Medical History:  Past Medical History:   Diagnosis Date   • Anemia    • COPD (chronic obstructive pulmonary disease) (CMS/[facility])    • Diabetes mellitus (CMS/[facility])    • Ear infection    • GERD (gastroesophageal reflux disease)    • Hypertension    • Myocardial infarction (CMS/[facility])    • Neuropathy in diabetes (CMS/[facility])    • Osteoarthritis            Past Surgical History:  Past Surgical History:   Procedure Laterality Date   • CHOLECYSTECTOMY     • HYSTERECTOMY      partial   • LAPAROSCOPIC TUBAL LIGATION      vaginally           Family History:  Family History   Problem Relation Age of Onset   • Diabetes Mother    • Hypertension Mother    • Diabetes Maternal Grandmother    • Diabetes Maternal Grandfather    • Breast cancer Neg Hx            Social History:  Social History     Socioeconomic History   • Marital status:      Spouse name: Not on file   • Number of children: Not on file   • Years of education: Not on file   • Highest education level: Not on file   Tobacco Use   • Smoking status: Current Every Day Smoker     Packs/day: 0.50     Years: 50.00     Pack years: 25.00     Types: Cigarettes   • Smokeless tobacco: Never Used   Substance and Sexual Activity   • Alcohol use: No   • Drug use: No   • Sexual activity: Defer     Body mass index is 34.78  kg/m².        Medications:  Current Outpatient Medications   Medication Sig Dispense Refill   • budesonide-formoterol (SYMBICORT) 160-4.5 MCG/ACT inhaler Inhale 2 puffs 2 (Two) Times a Day.     • cyclobenzaprine (FLEXERIL) 10 MG tablet Take 10 mg by mouth 2 (Two) Times a Day As Needed for muscle spasms.     • diclofenac (VOLTAREN) 1 % gel gel Apply 2 g topically 2 (Two) Times a Day. TO THE WRIST,   4G TO KNEE TWO TIMES A DAY.     • docusate calcium (SURFAK) 240 MG capsule Take 1 capsule by mouth Daily. 30 capsule 0   • fluticasone (FLONASE) 50 MCG/ACT nasal spray 1 spray into each nostril Daily. ONE SPRAY IN EACH NOSTRIL.     • gabapentin (NEURONTIN) 300 MG capsule Take 400 mg by mouth 3 (Three) Times a Day.     • hydrochlorothiazide (HYDRODIURIL) 25 MG tablet Take 25 mg by mouth Daily.     • HYDROcodone-acetaminophen (NORCO) 7.5-325 MG per tablet Take 1 tablet by mouth 2 (Two) Times a Day.     • HYDROcodone-ibuprofen (VICOPROFEN) 7.5-200 MG per tablet Take 1 tablet by mouth.     • insulin glargine (LANTUS) 100 UNIT/ML injection Inject 60 Units under the skin Every Night.     • metFORMIN (GLUCOPHAGE) 1000 MG tablet Take 1,000 mg by mouth 2 (Two) Times a Day With Meals.     • metoprolol succinate XL (TOPROL-XL) 50 MG 24 hr tablet Take 1 tablet by mouth 2 (Two) Times a Day. 30 tablet 0   • montelukast (SINGULAIR) 10 MG tablet Take 10 mg by mouth Daily.     • nystatin (MYCOSTATIN) 733927 UNIT/ML suspension Swish and swallow 500,000 Units 3 (Three) Times a Day.     • omeprazole (PriLOSEC) 20 MG capsule Take 20 mg by mouth Daily.     • potassium chloride (K-DUR) 10 MEQ CR tablet Take 10 mEq by mouth Daily.     • zolpidem (AMBIEN) 10 MG tablet Take 10 mg by mouth At Night As Needed for sleep.       No current facility-administered medications for this visit.           Allergies:  Allergies   Allergen Reactions   • Clonidine Derivatives Shortness Of Breath           Review of Systems:   Review of Systems        Physical  "Exam:   Physical Exam  GENERAL: 75 y.o. female, alert and oriented X 3 in no acute distress.   Visit Vitals  /63   Pulse 66   Temp 97.1 °F (36.2 °C)   Ht 157.5 cm (62.01\")   Wt 86.3 kg (190 lb 3.2 oz)   BMI 34.78 kg/m²         Musculoskeletal:   Examination         Radiology/Labs:    No radiology results for the last 30 days.        Assessment & Plan:   75 y.o. female presents    No diagnosis found.        Cc:   Martin Aguilera MD              This document has been electronically signed by Js Howard MD   May 5, 2021 10:44 EDT                "

## 2021-06-03 ENCOUNTER — OFFICE VISIT (OUTPATIENT)
Dept: CARDIOLOGY | Facility: CLINIC | Age: 76
End: 2021-06-03

## 2021-06-03 VITALS
HEIGHT: 62 IN | WEIGHT: 183.4 LBS | BODY MASS INDEX: 33.75 KG/M2 | DIASTOLIC BLOOD PRESSURE: 58 MMHG | TEMPERATURE: 97.7 F | OXYGEN SATURATION: 99 % | SYSTOLIC BLOOD PRESSURE: 114 MMHG | HEART RATE: 65 BPM

## 2021-06-03 DIAGNOSIS — Z01.810 PRE-OPERATIVE CARDIOVASCULAR EXAMINATION: Primary | ICD-10-CM

## 2021-06-03 DIAGNOSIS — E78.2 MIXED HYPERLIPIDEMIA: ICD-10-CM

## 2021-06-03 DIAGNOSIS — I10 ESSENTIAL HYPERTENSION: ICD-10-CM

## 2021-06-03 DIAGNOSIS — R09.89 BRUIT OF LEFT CAROTID ARTERY: ICD-10-CM

## 2021-06-03 DIAGNOSIS — F17.210 CIGARETTE SMOKER: ICD-10-CM

## 2021-06-03 PROCEDURE — 93000 ELECTROCARDIOGRAM COMPLETE: CPT | Performed by: SPECIALIST

## 2021-06-03 PROCEDURE — 99214 OFFICE O/P EST MOD 30 MIN: CPT | Performed by: SPECIALIST

## 2021-06-03 NOTE — PROGRESS NOTES
Subjective   Follow up, preoperative cardiac risk assessment  Carlos Salazar is a 75 y.o. female who presents to day for Surgical Clearance (right knee replacement) and Med Management (verbal).    CHIEF COMPLIANT  Chief Complaint   Patient presents with   • Surgical Clearance     right knee replacement   • Med Management     verbal       Active Problems:  Problem List Items Addressed This Visit        Cardiac and Vasculature    Pre-operative cardiovascular examination - Primary    Essential hypertension    Mixed hyperlipidemia    Bruit of left carotid artery    Relevant Orders    Duplex Carotid Ultrasound CAR       Tobacco    Cigarette smoker          HPI  HPI  She will need knee replacement for the right knee first patient denies any chest pain and she does have some occasional shortness of breath on moderate exertion which is stable for long time she has been recently treated with antibiotics for sinusitis and she denies any edema or palpitations  PRIOR MEDS  Current Outpatient Medications on File Prior to Visit   Medication Sig Dispense Refill   • budesonide-formoterol (SYMBICORT) 160-4.5 MCG/ACT inhaler Inhale 2 puffs 2 (Two) Times a Day.     • cyclobenzaprine (FLEXERIL) 10 MG tablet Take 10 mg by mouth 2 (Two) Times a Day As Needed for muscle spasms.     • diclofenac (VOLTAREN) 1 % gel gel Apply 2 g topically 2 (Two) Times a Day. TO THE WRIST,   4G TO KNEE TWO TIMES A DAY.     • docusate calcium (SURFAK) 240 MG capsule Take 1 capsule by mouth Daily. 30 capsule 0   • fluticasone (FLONASE) 50 MCG/ACT nasal spray 1 spray into each nostril Daily. ONE SPRAY IN EACH NOSTRIL.     • gabapentin (NEURONTIN) 300 MG capsule Take 400 mg by mouth 3 (Three) Times a Day.     • hydrochlorothiazide (HYDRODIURIL) 25 MG tablet Take 25 mg by mouth Daily.     • HYDROcodone-acetaminophen (NORCO) 7.5-325 MG per tablet Take 1 tablet by mouth 2 (Two) Times a Day.     • HYDROcodone-ibuprofen (VICOPROFEN) 7.5-200 MG per tablet Take 1  tablet by mouth.     • insulin glargine (LANTUS) 100 UNIT/ML injection Inject 60 Units under the skin Every Night.     • metFORMIN (GLUCOPHAGE) 1000 MG tablet Take 1,000 mg by mouth 2 (Two) Times a Day With Meals.     • metoprolol succinate XL (TOPROL-XL) 50 MG 24 hr tablet Take 1 tablet by mouth 2 (Two) Times a Day. 30 tablet 0   • montelukast (SINGULAIR) 10 MG tablet Take 10 mg by mouth Daily.     • nystatin (MYCOSTATIN) 469447 UNIT/ML suspension Swish and swallow 500,000 Units 3 (Three) Times a Day.     • omeprazole (PriLOSEC) 20 MG capsule Take 20 mg by mouth Daily.     • potassium chloride (K-DUR) 10 MEQ CR tablet Take 10 mEq by mouth Daily.     • zolpidem (AMBIEN) 10 MG tablet Take 10 mg by mouth At Night As Needed for sleep.       No current facility-administered medications on file prior to visit.       ALLERGIES  Clonidine derivatives    HISTORY  Past Medical History:   Diagnosis Date   • Anemia    • COPD (chronic obstructive pulmonary disease) (CMS/Formerly [facility])    • Diabetes mellitus (CMS/[facility]ly [facility])    • Ear infection    • GERD (gastroesophageal reflux disease)    • Hypertension    • Myocardial infarction (CMS/Formerly [facility])    • Neuropathy in diabetes (CMS/Formerly [facility] [place])    • Osteoarthritis        Social History     Socioeconomic History   • Marital status:      Spouse name: Not on file   • Number of children: Not on file   • Years of education: Not on file   • Highest education level: Not on file   Tobacco Use   • Smoking status: Current Every Day Smoker     Packs/day: 0.50     Years: 50.00     Pack years: 25.00     Types: Cigarettes   • Smokeless tobacco: Never Used   Substance and Sexual Activity   • Alcohol use: No   • Drug use: No   • Sexual activity: Defer       Family History   Problem Relation Age of Onset   • Diabetes Mother    • Hypertension Mother    • Diabetes Maternal Grandmother    • Diabetes Maternal Grandfather    • Breast cancer Neg Hx        Review of Systems   Constitutional: Negative for activity change and  "appetite change.   Respiratory: Positive for shortness of breath. Negative for apnea, cough, choking, chest tightness, wheezing and stridor.    Cardiovascular: Negative.  Negative for chest pain, palpitations and leg swelling.   Gastrointestinal: Negative for abdominal distention.   Neurological: Negative for dizziness and syncope.       Objective     VITALS: /58 (BP Location: Left arm, Patient Position: Sitting, Cuff Size: Adult)   Pulse 65   Temp 97.7 °F (36.5 °C) (Infrared)   Ht 157.5 cm (62\")   Wt 83.2 kg (183 lb 6.4 oz)   SpO2 99%   BMI 33.54 kg/m²     LABS:   Lab Results (most recent)     None          IMAGING:   No Images in the past 120 days found..    EXAM:  Physical Exam  Vitals reviewed.   Constitutional:       Appearance: She is well-developed.   HENT:      Head: Normocephalic and atraumatic.   Eyes:      Pupils: Pupils are equal, round, and reactive to light.   Neck:      Thyroid: No thyromegaly.      Vascular: Carotid bruit present. No JVD.      Comments: Left carotid bruit  Cardiovascular:      Rate and Rhythm: Normal rate and regular rhythm.      Heart sounds: Normal heart sounds. No murmur heard.   No friction rub. No gallop.    Pulmonary:      Effort: Pulmonary effort is normal. No respiratory distress.      Breath sounds: Normal breath sounds. No stridor. No wheezing or rales.   Chest:      Chest wall: No tenderness.   Musculoskeletal:         General: No tenderness or deformity.      Cervical back: Neck supple.   Skin:     General: Skin is warm and dry.   Neurological:      Mental Status: She is alert and oriented to person, place, and time.      Cranial Nerves: No cranial nerve deficit.      Coordination: Coordination normal.         Procedure     ECG 12 Lead    Date/Time: 6/3/2021 8:58 AM  Performed by: Alec Agrawal MD  Authorized by: Alec Agrawal MD           EKG: Normal sinus rhythm with nonspecific T wave changes otherwise unremarkable EKG compared with the EKG on 7/21/2020 " no significant change       Assessment/Plan     Diagnoses and all orders for this visit:    1. Pre-operative cardiovascular examination (Primary)    2. Essential hypertension    3. Mixed hyperlipidemia    4. Cigarette smoker    5. Bruit of left carotid artery  -     Duplex Carotid Ultrasound CAR; Future    Other orders  -     ECG 12 Lead    1.  Patient had stress test negative for ischemia last year echocardiogram with normal systolic function no change in her symptoms so her preoperative cardiac risk for knee surgery is mild to moderate she can hold aspirin 5 to 7 days prior to the surgery [she is advised to take aspirin 81 mg/day]  2.  Her blood pressure is well controlled we will continue the current medications including metoprolol  3.  I have reviewed her labs from her primary doctor's office with the lipids are acceptable her LDL is slightly suboptimal 77 and her hemoglobin A1c is 7.7.  4.  I had left carotid bruit get a carotid Doppler  5.  Long discussion regarding tobacco abuse she says she will try to quit she already has nicotine patch      Return in about 2 weeks (around 6/17/2021).      Advance Care Planning   ACP discussion was held with the patient during this visit. Patient does not have an advance directive, information provided.          MEDS ORDERED DURING VISIT:  No orders of the defined types were placed in this encounter.      As always, Martin Aguilera MD  I appreciate very much the opportunity to participate in the cardiovascular care of your patients. Please do not hesitate to call me with any questions with regards to Carlos Salazar evaluation and management.         This document has been electronically signed by Alec Agrawal MD  Ca 3, 2021 09:50 EDT

## 2021-06-15 ENCOUNTER — HOSPITAL ENCOUNTER (OUTPATIENT)
Dept: CARDIOLOGY | Facility: HOSPITAL | Age: 76
Discharge: HOME | End: 2021-06-15
Admitting: SPECIALIST

## 2021-06-15 DIAGNOSIS — R09.89 BRUIT OF LEFT CAROTID ARTERY: ICD-10-CM

## 2021-06-15 PROCEDURE — 93880 EXTRACRANIAL BILAT STUDY: CPT | Performed by: RADIOLOGY

## 2021-06-15 PROCEDURE — 93880 EXTRACRANIAL BILAT STUDY: CPT

## 2021-06-16 ENCOUNTER — OFFICE VISIT (OUTPATIENT)
Dept: CARDIOLOGY | Facility: CLINIC | Age: 76
End: 2021-06-16

## 2021-06-16 VITALS
HEART RATE: 74 BPM | TEMPERATURE: 98.4 F | DIASTOLIC BLOOD PRESSURE: 60 MMHG | HEIGHT: 62 IN | WEIGHT: 186.6 LBS | BODY MASS INDEX: 34.34 KG/M2 | SYSTOLIC BLOOD PRESSURE: 114 MMHG

## 2021-06-16 DIAGNOSIS — Z72.0 TOBACCO ABUSE: ICD-10-CM

## 2021-06-16 DIAGNOSIS — R09.89 BRUIT OF LEFT CAROTID ARTERY: Primary | ICD-10-CM

## 2021-06-16 DIAGNOSIS — Z13.6 ENCOUNTER FOR ABDOMINAL AORTIC ANEURYSM SCREENING: ICD-10-CM

## 2021-06-16 DIAGNOSIS — R93.89 ABNORMAL CAROTID ULTRASOUND: ICD-10-CM

## 2021-06-16 PROCEDURE — 99214 OFFICE O/P EST MOD 30 MIN: CPT | Performed by: NURSE PRACTITIONER

## 2021-06-16 RX ORDER — ATORVASTATIN CALCIUM 40 MG/1
40 TABLET, FILM COATED ORAL DAILY
Qty: 30 TABLET | Refills: 3 | Status: SHIPPED | OUTPATIENT
Start: 2021-06-16 | End: 2021-09-13

## 2021-06-16 NOTE — PROGRESS NOTES
Martin Aguilera MD  Carlos Salazar  1945  06/16/2021    Patient Active Problem List   Diagnosis   • Intestinal malabsorption   • Fe deficiency anemia   • Acute cystitis without hematuria   • Encounter for abdominal aortic aneurysm screening   • Precordial pain   • Shortness of breath   • Essential hypertension   • Mixed hyperlipidemia   • Cigarette smoker   • Primary osteoarthritis of right knee   • Abnormal carotid ultrasound       Dear Martin Aguilera MD:    Subjective     Chief Complaint   Patient presents with   • Follow-up     2 week follow up    • Med Management     verbal       History of Present Illness:    Carlos Salazar is a 75 y.o. female with a past medical history of essential hypertension, diabetes mellitus type 2, hyperlipidemia and tobacco abuse.  Patient presented to the office today for routine follow-up.  Patient states that since her last visit she has been feeling okay.  Denies any chest pain or lower extremity edema.  She does report some shortness of breath due to congestion and allergies.  She has recently been on antibiotics for this.  She does report that she has been having a pulsation sensations near her navel in her abdomen.  Denies any history of abdominal aortic aneurysm in the past.  She denies any dizziness or syncope.  She currently smoking 10 cigarettes a day and has been smoking since she was 20.  She was seen by orthopedics and May 2021 for possible right total knee replacement however cardiac clearance was required.  On her last visit she was noted to have a carotid bruit therefore carotid ultrasound was ordered which showed plaque of less than 50% on the left side with turbulent flow with a stenosis of about 50 to 69% with turbulent flow on the right side.  She reports that she thinks she takes a statin at home however this is not on her med list.  Echocardiogram in July 2020 showed normal LV function.  Stress test in July 2020 showed no ischemia.  Patient was supposed to  have lipid panel and CMP before office visit today however she did not have this done.    Allergies   Allergen Reactions   • Clonidine Derivatives Shortness Of Breath   :      Current Outpatient Medications:   •  budesonide-formoterol (SYMBICORT) 160-4.5 MCG/ACT inhaler, Inhale 2 puffs 2 (Two) Times a Day., Disp: , Rfl:   •  cyclobenzaprine (FLEXERIL) 10 MG tablet, Take 10 mg by mouth 2 (Two) Times a Day As Needed for muscle spasms., Disp: , Rfl:   •  gabapentin (NEURONTIN) 300 MG capsule, Take 400 mg by mouth 3 (Three) Times a Day., Disp: , Rfl:   •  hydrochlorothiazide (HYDRODIURIL) 25 MG tablet, Take 25 mg by mouth Daily., Disp: , Rfl:   •  HYDROcodone-acetaminophen (NORCO) 7.5-325 MG per tablet, Take 1 tablet by mouth 2 (Two) Times a Day., Disp: , Rfl:   •  insulin glargine (LANTUS) 100 UNIT/ML injection, Inject 60 Units under the skin Every Night., Disp: , Rfl:   •  metFORMIN (GLUCOPHAGE) 1000 MG tablet, Take 1,000 mg by mouth 2 (Two) Times a Day With Meals., Disp: , Rfl:   •  metoprolol succinate XL (TOPROL-XL) 50 MG 24 hr tablet, Take 1 tablet by mouth 2 (Two) Times a Day., Disp: 30 tablet, Rfl: 0  •  montelukast (SINGULAIR) 10 MG tablet, Take 10 mg by mouth Daily., Disp: , Rfl:   •  omeprazole (PriLOSEC) 20 MG capsule, Take 20 mg by mouth Daily., Disp: , Rfl:   •  potassium chloride (K-DUR) 10 MEQ CR tablet, Take 10 mEq by mouth Daily., Disp: , Rfl:   •  zolpidem (AMBIEN) 10 MG tablet, Take 10 mg by mouth At Night As Needed for sleep., Disp: , Rfl:   •  atorvastatin (LIPITOR) 40 MG tablet, Take 1 tablet by mouth Daily., Disp: 30 tablet, Rfl: 3  •  diclofenac (VOLTAREN) 1 % gel gel, Apply 2 g topically 2 (Two) Times a Day. TO THE WRIST,  4G TO KNEE TWO TIMES A DAY., Disp: , Rfl:   •  docusate calcium (SURFAK) 240 MG capsule, Take 1 capsule by mouth Daily., Disp: 30 capsule, Rfl: 0  •  fluticasone (FLONASE) 50 MCG/ACT nasal spray, 1 spray into each nostril Daily. ONE SPRAY IN EACH NOSTRIL., Disp: , Rfl:   •   "HYDROcodone-ibuprofen (VICOPROFEN) 7.5-200 MG per tablet, Take 1 tablet by mouth., Disp: , Rfl:   •  nystatin (MYCOSTATIN) 652272 UNIT/ML suspension, Swish and swallow 500,000 Units 3 (Three) Times a Day., Disp: , Rfl:       The following portions of the patient's history were reviewed and updated as appropriate: allergies, current medications, past family history, past medical history, past social history, past surgical history and problem list.    Social History     Tobacco Use   • Smoking status: Current Every Day Smoker     Packs/day: 0.50     Years: 50.00     Pack years: 25.00     Types: Cigarettes   • Smokeless tobacco: Never Used   Substance Use Topics   • Alcohol use: No   • Drug use: No       Objective   Vitals:    06/16/21 1349   BP: 114/60   Pulse: 74   Temp: 98.4 °F (36.9 °C)   Weight: 84.6 kg (186 lb 9.6 oz)   Height: 157.5 cm (62.01\")     Body mass index is 34.12 kg/m².      Constitutional:       Appearance: Healthy appearance. Well-developed.   Eyes:      Pupils: Pupils are equal, round, and reactive to light.   HENT:      Head: Normocephalic.   Neck:      Vascular: Carotid bruit (left) present.      Lymphadenopathy: No cervical adenopathy.   Pulmonary:      Effort: Pulmonary effort is normal. No respiratory distress.      Breath sounds: Normal breath sounds. No wheezing.   Cardiovascular:      Normal rate. Regular rhythm. Normal S1. Normal S2.      . No S3 and S4 gallop.   Pulses:     Intact distal pulses.   Abdominal:      General: Bowel sounds are normal. There is no distension.      Palpations: Abdomen is soft.      Tenderness: There is no abdominal tenderness.   Musculoskeletal:      Cervical back: Normal range of motion. Skin:     General: Skin is warm and dry.      Findings: No erythema.   Neurological:      Mental Status: Alert, oriented to person, place, and time and oriented to person, place and time.   Psychiatric:         Attention and Perception: Attention normal.         Mood and Affect: " Mood normal.         Behavior: Behavior normal.         Thought Content: Thought content normal.         Lab Results   Component Value Date     12/01/2016    K 3.2 (L) 12/01/2016     12/01/2016    CO2 27.9 12/01/2016    BUN 7 12/01/2016    CREATININE 0.83 12/01/2016    GLUCOSE 190 (H) 12/01/2016    CALCIUM 9.4 12/01/2016    AST 65 (H) 12/01/2016    ALT 42 (H) 12/01/2016    ALKPHOS 156 (H) 12/01/2016    LABIL2 1.3 (L) 02/10/2016     No results found for: CKTOTAL  Lab Results   Component Value Date    WBC 8.04 12/01/2016    HGB 13.6 12/01/2016    HCT 43.3 12/01/2016     12/01/2016     Lab Results   Component Value Date    INR 0.96 02/10/2016     Lab Results   Component Value Date    MG 2.0 12/01/2016     Lab Results   Component Value Date    TSH 1.484 04/02/2015    CHLPL 112 04/02/2015    TRIG 128 04/02/2015    HDL 44 (L) 04/02/2015    LDL 43 04/02/2015      Assessment/Plan    Diagnosis Plan   1. Bruit of left carotid artery  CT angiogram carotids   2. Abnormal carotid ultrasound  CT angiogram carotids    atorvastatin (LIPITOR) 40 MG tablet   3. Encounter for abdominal aortic aneurysm screening  US Abdomen Complete   4. Tobacco abuse            Recommendations:    1. Carotid artery disease  · Carotid ultrasound was abnormal showing 50 to 69% stenosis in the right carotid artery with less than 50% stenosis in the left carotid artery.  Will evaluate further with a CT angiogram of carotids.  Patient will not be cleared for knee replacement until her results are back.  She verbalized understanding.  · Lipid panel and CMP was ordered at last visit however patient did not get this done.  Reemphasized the importance of patient having lipid panel and CMP and will reorder this.  She verbalizes understanding  · Patient reports that she thinks she takes a statin at home however this is not on her medication list.  Will order Lipitor for now given her abnormal carotid ultrasound and will follow-up on lipid  panel.    2.  Screening for abdominal aortic aneurysm  · Patient reports pulsation sensation in her abdomen.  Given her age and history of tobacco abuse will order abdominal ultrasound to assess further for possible abdominal aortic aneurysm.    3.  Tobacco abuse  · Patient states that she smokes 10 cigarettes a day.  Discussed with her about the importance of smoking cessation to reduce her risk of cardiovascular disease.  She verbalizes understanding but is not willing to quit at this time.        Return in 4 weeks (on 7/14/2021) for Next scheduled follow up.    As always, I appreciate very much the opportunity to participate in the cardiovascular care of your patients.      With Best Regards,    CLARENCE Antunez

## 2021-07-09 ENCOUNTER — LAB (OUTPATIENT)
Dept: LAB | Facility: HOSPITAL | Age: 76
End: 2021-07-09

## 2021-07-09 ENCOUNTER — HOSPITAL ENCOUNTER (OUTPATIENT)
Dept: ULTRASOUND IMAGING | Facility: HOSPITAL | Age: 76
Discharge: HOME | End: 2021-07-09

## 2021-07-09 ENCOUNTER — HOSPITAL ENCOUNTER (OUTPATIENT)
Dept: CT IMAGING | Facility: HOSPITAL | Age: 76
Discharge: HOME | End: 2021-07-09

## 2021-07-09 DIAGNOSIS — Z13.6 ENCOUNTER FOR ABDOMINAL AORTIC ANEURYSM SCREENING: ICD-10-CM

## 2021-07-09 DIAGNOSIS — E78.2 MIXED HYPERLIPIDEMIA: ICD-10-CM

## 2021-07-09 DIAGNOSIS — R09.89 BRUIT OF LEFT CAROTID ARTERY: ICD-10-CM

## 2021-07-09 DIAGNOSIS — R93.89 ABNORMAL CAROTID ULTRASOUND: ICD-10-CM

## 2021-07-09 LAB
ALBUMIN SERPL-MCNC: 3.8 G/DL (ref 3.5–5.2)
ALBUMIN/GLOB SERPL: 1.5 G/DL
ALP SERPL-CCNC: 123 U/L (ref 39–117)
ALT SERPL W P-5'-P-CCNC: 17 U/L (ref 1–33)
ANION GAP SERPL CALCULATED.3IONS-SCNC: 7.8 MMOL/L (ref 5–15)
AST SERPL-CCNC: 27 U/L (ref 1–32)
BILIRUB SERPL-MCNC: 0.3 MG/DL (ref 0–1.2)
BUN SERPL-MCNC: 20 MG/DL (ref 8–23)
BUN/CREAT SERPL: 20.6 (ref 7–25)
CALCIUM SPEC-SCNC: 9.1 MG/DL (ref 8.6–10.5)
CHLORIDE SERPL-SCNC: 97 MMOL/L (ref 98–107)
CHOLEST SERPL-MCNC: 86 MG/DL (ref 0–200)
CO2 SERPL-SCNC: 31.2 MMOL/L (ref 22–29)
CREAT BLDA-MCNC: 1.1 MG/DL (ref 0.6–1.3)
CREAT SERPL-MCNC: 0.97 MG/DL (ref 0.57–1)
GFR SERPL CREATININE-BSD FRML MDRD: 56 ML/MIN/1.73
GLOBULIN UR ELPH-MCNC: 2.6 GM/DL
GLUCOSE SERPL-MCNC: 115 MG/DL (ref 65–99)
HDLC SERPL-MCNC: 41 MG/DL (ref 40–60)
LDLC SERPL CALC-MCNC: 28 MG/DL (ref 0–100)
LDLC/HDLC SERPL: 0.7 {RATIO}
POTASSIUM SERPL-SCNC: 4.2 MMOL/L (ref 3.5–5.2)
PROT SERPL-MCNC: 6.4 G/DL (ref 6–8.5)
SODIUM SERPL-SCNC: 136 MMOL/L (ref 136–145)
TRIGL SERPL-MCNC: 82 MG/DL (ref 0–150)
VLDLC SERPL-MCNC: 17 MG/DL (ref 5–40)

## 2021-07-09 PROCEDURE — 76706 US ABDL AORTA SCREEN AAA: CPT

## 2021-07-09 PROCEDURE — 76706 US ABDL AORTA SCREEN AAA: CPT | Performed by: RADIOLOGY

## 2021-07-09 PROCEDURE — 36415 COLL VENOUS BLD VENIPUNCTURE: CPT

## 2021-07-09 PROCEDURE — 82565 ASSAY OF CREATININE: CPT | Performed by: SPECIALIST

## 2021-07-09 PROCEDURE — 80061 LIPID PANEL: CPT

## 2021-07-09 PROCEDURE — 70498 CT ANGIOGRAPHY NECK: CPT

## 2021-07-09 PROCEDURE — 80053 COMPREHEN METABOLIC PANEL: CPT

## 2021-07-09 PROCEDURE — 25010000002 IOPAMIDOL 61 % SOLUTION: Performed by: NURSE PRACTITIONER

## 2021-07-09 PROCEDURE — 70498 CT ANGIOGRAPHY NECK: CPT | Performed by: RADIOLOGY

## 2021-07-09 RX ADMIN — IOPAMIDOL 60 ML: 612 INJECTION, SOLUTION INTRAVENOUS at 09:52

## 2021-07-21 ENCOUNTER — OFFICE VISIT (OUTPATIENT)
Dept: CARDIOLOGY | Facility: CLINIC | Age: 76
End: 2021-07-21

## 2021-07-21 ENCOUNTER — APPOINTMENT (OUTPATIENT)
Dept: GENERAL RADIOLOGY | Facility: HOSPITAL | Age: 76
End: 2021-07-21

## 2021-07-21 VITALS
BODY MASS INDEX: 35.77 KG/M2 | HEIGHT: 62 IN | SYSTOLIC BLOOD PRESSURE: 145 MMHG | HEART RATE: 89 BPM | DIASTOLIC BLOOD PRESSURE: 59 MMHG | WEIGHT: 194.4 LBS | OXYGEN SATURATION: 97 % | TEMPERATURE: 97 F

## 2021-07-21 DIAGNOSIS — R93.89 ABNORMAL CAROTID ULTRASOUND: ICD-10-CM

## 2021-07-21 DIAGNOSIS — R06.02 SHORTNESS OF BREATH: Primary | ICD-10-CM

## 2021-07-21 DIAGNOSIS — R60.0 BILATERAL LOWER EXTREMITY EDEMA: ICD-10-CM

## 2021-07-21 DIAGNOSIS — I10 ESSENTIAL HYPERTENSION: ICD-10-CM

## 2021-07-21 DIAGNOSIS — R63.5 WEIGHT GAIN: ICD-10-CM

## 2021-07-21 DIAGNOSIS — F17.210 CIGARETTE SMOKER: ICD-10-CM

## 2021-07-21 PROCEDURE — 99214 OFFICE O/P EST MOD 30 MIN: CPT | Performed by: NURSE PRACTITIONER

## 2021-07-21 RX ORDER — FUROSEMIDE 20 MG/1
20 TABLET ORAL DAILY
COMMUNITY
End: 2021-11-30 | Stop reason: SDUPTHER

## 2021-07-21 RX ORDER — CHOLECALCIFEROL (VITAMIN D3) 125 MCG
500 CAPSULE ORAL DAILY
COMMUNITY

## 2021-07-21 RX ORDER — AMLODIPINE BESYLATE 5 MG/1
5 TABLET ORAL DAILY
COMMUNITY
End: 2021-11-30 | Stop reason: SDUPTHER

## 2021-07-21 RX ORDER — ASPIRIN 81 MG/1
81 TABLET ORAL DAILY
COMMUNITY

## 2021-07-21 RX ORDER — ALBUTEROL SULFATE 90 UG/1
2 AEROSOL, METERED RESPIRATORY (INHALATION) EVERY 4 HOURS PRN
COMMUNITY

## 2021-07-21 RX ORDER — LISINOPRIL 20 MG/1
20 TABLET ORAL DAILY
COMMUNITY
End: 2021-11-30 | Stop reason: SDUPTHER

## 2021-07-21 RX ORDER — OFLOXACIN 3 MG/ML
5 SOLUTION AURICULAR (OTIC) DAILY
COMMUNITY

## 2021-07-21 NOTE — PROGRESS NOTES
Martin Aguilera MD  Carlos Salazar  1945  07/21/2021    Patient Active Problem List   Diagnosis   • Intestinal malabsorption   • Fe deficiency anemia   • Acute cystitis without hematuria   • Encounter for abdominal aortic aneurysm screening   • Precordial pain   • Shortness of breath   • Essential hypertension   • Mixed hyperlipidemia   • Cigarette smoker   • Primary osteoarthritis of right knee   • Abnormal carotid ultrasound   • Bilateral lower extremity edema   • Weight gain       Dear Martin Aguilera MD:    Subjective     Chief Complaint   Patient presents with   • Follow-up     ROUTINE   • Shortness of Breath       History of Present Illness:    Carlos Salazar is a 76 y.o. female with a history of essential hypertension, diabetes mellitus type 2, hyperlipidemia and tobacco abuse.  Patient presents today for routine follow-up for results review and shortness of breath.  Patient states that over the last couple of days she has had worsening shortness of breath at rest and with exertion along with lower extremity edema.  She also states that she has had a 10 pound weight gain over the last 4 days.  Denies any current chest pain.  She reports that she was scheduled to have knee surgery however this was unable to be done due to her hemoglobin A1c being elevated per her report.  She had a abdominal aortic aneurysm ultrasound which was negative for aneurysm.  She also had CT angiogram of the carotids which showed 50-69% stenosis in the right carotid artery and 50 to 69% stenosis in the left carotid artery.  CMP showed normal kidney function.  Lipid panel showed controlled lipids.      Allergies   Allergen Reactions   • Clonidine Derivatives Shortness Of Breath   :      Current Outpatient Medications:   •  albuterol sulfate  (90 Base) MCG/ACT inhaler, Inhale 2 puffs Every 4 (Four) Hours As Needed for Wheezing., Disp: , Rfl:   •  amLODIPine (NORVASC) 5 MG tablet, Take 5 mg by mouth Daily., Disp: , Rfl:   •   aspirin 81 MG EC tablet, Take 81 mg by mouth Daily., Disp: , Rfl:   •  atorvastatin (LIPITOR) 40 MG tablet, Take 1 tablet by mouth Daily., Disp: 30 tablet, Rfl: 3  •  budesonide-formoterol (SYMBICORT) 160-4.5 MCG/ACT inhaler, Inhale 2 puffs 2 (Two) Times a Day., Disp: , Rfl:   •  cyclobenzaprine (FLEXERIL) 10 MG tablet, Take 10 mg by mouth 2 (Two) Times a Day As Needed for muscle spasms., Disp: , Rfl:   •  docusate calcium (SURFAK) 240 MG capsule, Take 1 capsule by mouth Daily., Disp: 30 capsule, Rfl: 0  •  Fe Bisgly-Vit C-Vit B12-FA (GENTLE IRON PO), Take  by mouth., Disp: , Rfl:   •  furosemide (LASIX) 20 MG tablet, Take 20 mg by mouth Daily., Disp: , Rfl:   •  gabapentin (NEURONTIN) 300 MG capsule, Take 400 mg by mouth 3 (Three) Times a Day., Disp: , Rfl:   •  hydrochlorothiazide (HYDRODIURIL) 25 MG tablet, Take 25 mg by mouth Daily., Disp: , Rfl:   •  HYDROcodone-acetaminophen (NORCO) 7.5-325 MG per tablet, Take 1 tablet by mouth 2 (Two) Times a Day., Disp: , Rfl:   •  insulin glargine (LANTUS) 100 UNIT/ML injection, Inject 60 Units under the skin Every Night., Disp: , Rfl:   •  lisinopril (PRINIVIL,ZESTRIL) 20 MG tablet, Take 20 mg by mouth Daily., Disp: , Rfl:   •  metFORMIN (GLUCOPHAGE) 1000 MG tablet, Take 1,000 mg by mouth 2 (Two) Times a Day With Meals., Disp: , Rfl:   •  metoprolol succinate XL (TOPROL-XL) 50 MG 24 hr tablet, Take 1 tablet by mouth 2 (Two) Times a Day., Disp: 30 tablet, Rfl: 0  •  montelukast (SINGULAIR) 10 MG tablet, Take 10 mg by mouth Daily., Disp: , Rfl:   •  ofloxacin (FLOXIN) 0.3 % otic solution, 5 drops Daily., Disp: , Rfl:   •  omeprazole (PriLOSEC) 20 MG capsule, Take 20 mg by mouth Daily., Disp: , Rfl:   •  potassium chloride (K-DUR) 10 MEQ CR tablet, Take 10 mEq by mouth Daily., Disp: , Rfl:   •  Probiotic Product (PROBIOTIC ADVANCED PO), Take  by mouth., Disp: , Rfl:   •  vitamin B-12 (CYANOCOBALAMIN) 500 MCG tablet, Take 500 mcg by mouth Daily., Disp: , Rfl:   •  zolpidem  "(AMBIEN) 10 MG tablet, Take 10 mg by mouth At Night As Needed for sleep., Disp: , Rfl:   •  nystatin (MYCOSTATIN) 946218 UNIT/ML suspension, Swish and swallow 500,000 Units 3 (Three) Times a Day., Disp: , Rfl:       The following portions of the patient's history were reviewed today and updated today as appropriate: allergies, current medications, past family history, past medical history, past social history, past surgical history and problem list.    Social History     Tobacco Use   • Smoking status: Current Every Day Smoker     Packs/day: 0.50     Years: 50.00     Pack years: 25.00     Types: Cigarettes   • Smokeless tobacco: Never Used   Substance Use Topics   • Alcohol use: No   • Drug use: No     Objective   Vitals:    07/21/21 1350   BP: 145/59   Pulse: 89   Temp: 97 °F (36.1 °C)   SpO2: 97%   Weight: 88.2 kg (194 lb 6.4 oz)   Height: 157.5 cm (62\")     Body mass index is 35.56 kg/m².    Constitutional:       Appearance: Healthy appearance. Well-developed.   Eyes:      Pupils: Pupils are equal, round, and reactive to light.   HENT:      Head: Normocephalic.   Neck:      Lymphadenopathy: No cervical adenopathy.   Pulmonary:      Effort: Pulmonary effort is normal. No respiratory distress.      Breath sounds: Normal breath sounds. No wheezing.   Cardiovascular:      Normal rate. Regular rhythm. Normal S1. Normal S2.      . No S3 and S4 gallop.   Edema:     Peripheral edema present.     Ankle: bilateral 1+ edema of the ankle.     Feet: bilateral 1+ edema of the feet.  Abdominal:      General: Bowel sounds are normal. There is distension.      Palpations: Abdomen is soft.      Tenderness: There is no abdominal tenderness.   Musculoskeletal:      Cervical back: Normal range of motion. Skin:     General: Skin is warm and dry.      Findings: No erythema.   Neurological:      Mental Status: Alert, oriented to person, place, and time and oriented to person, place and time.   Psychiatric:         Attention and " Perception: Attention normal.         Mood and Affect: Mood normal.         Behavior: Behavior normal.         Thought Content: Thought content normal.         Lab Results   Component Value Date     07/09/2021    K 4.2 07/09/2021    CL 97 (L) 07/09/2021    CO2 31.2 (H) 07/09/2021    BUN 20 07/09/2021    CREATININE 1.10 07/09/2021    GLUCOSE 115 (H) 07/09/2021    CALCIUM 9.1 07/09/2021    AST 27 07/09/2021    ALT 17 07/09/2021    ALKPHOS 123 (H) 07/09/2021    LABIL2 1.3 (L) 02/10/2016     No results found for: CKTOTAL  Lab Results   Component Value Date    WBC 8.04 12/01/2016    HGB 13.6 12/01/2016    HCT 43.3 12/01/2016     12/01/2016     Lab Results   Component Value Date    INR 0.96 02/10/2016     Lab Results   Component Value Date    MG 2.0 12/01/2016     Lab Results   Component Value Date    TSH 1.484 04/02/2015    CHLPL 112 04/02/2015    TRIG 82 07/09/2021    HDL 41 07/09/2021    LDL 28 07/09/2021        Assessment/Plan    Diagnosis Plan   1. Shortness of breath  Adult Transthoracic Echo Complete w/ Color, Spectral and Contrast if necessary per protocol    Basic Metabolic Panel    XR Chest 2 View    proBNP   2. Bilateral lower extremity edema  Adult Transthoracic Echo Complete w/ Color, Spectral and Contrast if necessary per protocol    Basic Metabolic Panel    XR Chest 2 View    proBNP   3. Essential hypertension     4. Abnormal carotid ultrasound     5. Cigarette smoker     6. Weight gain            Recommendations:    1. Shortness of breath/ Weight gain   · Patient reports recent significant weight gain over the last 3 days along with shortness of breath and lower extremity edema.  Will obtain chest x-ray and proBNP.  Patient takes 20 mg p.o. Lasix at home will increase this to 40 mg p.o. daily for 3 days and check BMP in 1 week.  · Obtain echocardiogram to evaluate LV function.  · Keep strict intake and output log at home.  · Patient advised to call the office in 3 days to update me on urine  output and symptoms.   · Instructed to go to the ER if shortness of breath worsens.     2.  Essential hypertension  · Blood pressure overall controlled.  Continue amlodipine, hydrochlorothiazide, lisinopril and metoprolol.    3.  Carotid artery stenosis  · Discussed with the patient about the results of her CT angio of carotids which showed moderate bilateral carotid artery stenosis.  Will continue to monitor.  Continue statin.  Discussed with the patient about the importance of smoking cessation.    4.  Tobacco abuse  · Discussed with the patient about the importance of smoking cessation to reduce her risk of cardiovascular disease.  She verbalizes understanding however does not appear that she wants to quit at this time.      Return in about 2 weeks (around 8/4/2021).    As always, I appreciate very much the opportunity to participate in the cardiovascular care of your patients.      With Best Regards,          CLARENCE Antunez

## 2021-07-21 NOTE — PROGRESS NOTES
[unfilled]    Name: Carlos Salazar  Age/Sex: 76 y.o. female  :  1945        PCP: Martin Aguilera MD  REF: No ref. provider found    Active Problems:    * No active hospital problems. *      Reason for follow-up:    Subjective       Subjective       Interval History:    ROS    Vital Signs  Temp:  [97 °F (36.1 °C)] 97 °F (36.1 °C)  Heart Rate:  [89] 89  BP: (145)/(59) 145/59  Vital Signs (last 72 hrs)        0700  -  / 0659  07  -   0659  07  -   0659  07  -   1406   Most Recent    Temp (°F)         97     97 (36.1)    Heart Rate         89     89    BP         145/59     145/59        Body mass index is 35.56 kg/m².  [unfilled]  Objective    Objective       Physical Exam:     General Appearance:    Alert, cooperative, in no acute distress   Head:    Normocephalic, without obvious abnormality, atraumatic   Eyes:            Conjunctivae and sclerae normal, no   icterus, no pallor, corneas clear.   Neck:   No adenopathy, supple, trachea midline, no thyromegaly, no   carotid bruit, no JVD   Lungs:     Clear to auscultation,respirations regular, even and                  unlabored    Heart:    Regular rhythm and normal rate, normal S1 and S2, no            murmur, no gallop, no rub, no click   Chest Wall:    No abnormalities observed   Abdomen:     Normal bowel sounds, no masses, no organomegaly, soft        non-tender, non-distended, no guarding, no rebound                tenderness   Extremities:   Moves all extremities well, no edema, no cyanosis, no             redness   Pulses:   Pulses palpable and equal bilaterally   Skin:   No bleeding, bruising or rash       Neurologic:             Procedures    Results review       Results Review:               Lab Results   Component Value Date    INR 0.96 02/10/2016     Lab Results   Component Value Date    MG 2.0 2016     Lab Results   Component Value Date    TSH 1.484 2015    CHLPL 112 2015    TRIG 82  07/09/2021    HDL 41 07/09/2021    LDL 28 07/09/2021      Imaging Results (Last 48 Hours)     ** No results found for the last 48 hours. **        No results found for: BNP      ECG      Echo   Results for orders placed during the hospital encounter of 08/18/20    Adult Transthoracic Echo Complete W/ Cont if Necessary Per Protocol    Interpretation Summary  · Left ventricular wall thickness is consistent with mild concentric hypertrophy.  · Left ventricular systolic function is hyperdynamic (EF > 70).  · Left ventricular diastolic dysfunction (grade I a) consistent with impaired relaxation.  · Mild mitral valve regurgitation is present  · Mild tricuspid valve regurgitation is present.  · Mild aortic valve regurgitation is present.      Nuclear Stress Test  .img     I reviewed the patient's new clinical results.    Telemetry:***       Medication Review:       No current facility-administered medications for this visit.      Assessment      Assessment:    * No active hospital problems. *          Plan     Recommendations:      I discussed the patients findings and my recommendations with patient and family      Gabi Vang [facility]  07/21/21  14:06 EDT    Please note that portions of this note were completed with a voice recognition program.

## 2021-07-23 ENCOUNTER — HOSPITAL ENCOUNTER (OUTPATIENT)
Dept: GENERAL RADIOLOGY | Facility: HOSPITAL | Age: 76
Discharge: HOME | End: 2021-07-23

## 2021-07-23 ENCOUNTER — LAB (OUTPATIENT)
Dept: LAB | Facility: HOSPITAL | Age: 76
End: 2021-07-23

## 2021-07-23 DIAGNOSIS — R60.0 BILATERAL LOWER EXTREMITY EDEMA: ICD-10-CM

## 2021-07-23 DIAGNOSIS — R06.02 SHORTNESS OF BREATH: Primary | ICD-10-CM

## 2021-07-23 DIAGNOSIS — R06.02 SHORTNESS OF BREATH: ICD-10-CM

## 2021-07-23 DIAGNOSIS — R63.5 WEIGHT GAIN: ICD-10-CM

## 2021-07-23 LAB
ANION GAP SERPL CALCULATED.3IONS-SCNC: 9.9 MMOL/L (ref 5–15)
BUN SERPL-MCNC: 17 MG/DL (ref 8–23)
BUN/CREAT SERPL: 21.8 (ref 7–25)
CALCIUM SPEC-SCNC: 8.5 MG/DL (ref 8.6–10.5)
CHLORIDE SERPL-SCNC: 100 MMOL/L (ref 98–107)
CO2 SERPL-SCNC: 28.1 MMOL/L (ref 22–29)
CREAT SERPL-MCNC: 0.78 MG/DL (ref 0.57–1)
GFR SERPL CREATININE-BSD FRML MDRD: 72 ML/MIN/1.73
GLUCOSE SERPL-MCNC: 107 MG/DL (ref 65–99)
NT-PROBNP SERPL-MCNC: 667.7 PG/ML (ref 0–1800)
POTASSIUM SERPL-SCNC: 4.1 MMOL/L (ref 3.5–5.2)
SODIUM SERPL-SCNC: 138 MMOL/L (ref 136–145)

## 2021-07-23 PROCEDURE — 71046 X-RAY EXAM CHEST 2 VIEWS: CPT | Performed by: RADIOLOGY

## 2021-07-23 PROCEDURE — 80048 BASIC METABOLIC PNL TOTAL CA: CPT

## 2021-07-23 PROCEDURE — 36415 COLL VENOUS BLD VENIPUNCTURE: CPT

## 2021-07-23 PROCEDURE — 71046 X-RAY EXAM CHEST 2 VIEWS: CPT

## 2021-07-23 PROCEDURE — 83880 ASSAY OF NATRIURETIC PEPTIDE: CPT

## 2021-07-26 ENCOUNTER — TELEPHONE (OUTPATIENT)
Dept: CARDIOLOGY | Facility: CLINIC | Age: 76
End: 2021-07-26

## 2021-07-26 ENCOUNTER — HOSPITAL ENCOUNTER (EMERGENCY)
Facility: HOSPITAL | Age: 76
Discharge: HOME | End: 2021-07-26
Attending: EMERGENCY MEDICINE | Admitting: EMERGENCY MEDICINE

## 2021-07-26 ENCOUNTER — APPOINTMENT (OUTPATIENT)
Dept: GENERAL RADIOLOGY | Facility: HOSPITAL | Age: 76
End: 2021-07-26

## 2021-07-26 VITALS
DIASTOLIC BLOOD PRESSURE: 42 MMHG | HEART RATE: 76 BPM | BODY MASS INDEX: 35.7 KG/M2 | HEIGHT: 62 IN | TEMPERATURE: 98.2 F | RESPIRATION RATE: 18 BRPM | WEIGHT: 194 LBS | OXYGEN SATURATION: 97 % | SYSTOLIC BLOOD PRESSURE: 102 MMHG

## 2021-07-26 DIAGNOSIS — D64.9 ANEMIA, UNSPECIFIED TYPE: Primary | ICD-10-CM

## 2021-07-26 DIAGNOSIS — J42 CHRONIC BRONCHITIS, UNSPECIFIED CHRONIC BRONCHITIS TYPE (HCC): ICD-10-CM

## 2021-07-26 LAB
ALBUMIN SERPL-MCNC: 3.53 G/DL (ref 3.5–5.2)
ALBUMIN/GLOB SERPL: 1.1 G/DL
ALP SERPL-CCNC: 131 U/L (ref 39–117)
ALT SERPL W P-5'-P-CCNC: 15 U/L (ref 1–33)
ANION GAP SERPL CALCULATED.3IONS-SCNC: 15.1 MMOL/L (ref 5–15)
AST SERPL-CCNC: 31 U/L (ref 1–32)
BASOPHILS # BLD AUTO: 0.12 10*3/MM3 (ref 0–0.2)
BASOPHILS NFR BLD AUTO: 1.4 % (ref 0–1.5)
BILIRUB SERPL-MCNC: 0.3 MG/DL (ref 0–1.2)
BUN SERPL-MCNC: 16 MG/DL (ref 8–23)
BUN/CREAT SERPL: 16 (ref 7–25)
CALCIUM SPEC-SCNC: 9.3 MG/DL (ref 8.6–10.5)
CHLORIDE SERPL-SCNC: 99 MMOL/L (ref 98–107)
CO2 SERPL-SCNC: 28.9 MMOL/L (ref 22–29)
CREAT SERPL-MCNC: 1 MG/DL (ref 0.57–1)
DEPRECATED RDW RBC AUTO: 63.5 FL (ref 37–54)
EOSINOPHIL # BLD AUTO: 0.31 10*3/MM3 (ref 0–0.4)
EOSINOPHIL NFR BLD AUTO: 3.5 % (ref 0.3–6.2)
ERYTHROCYTE [DISTWIDTH] IN BLOOD BY AUTOMATED COUNT: 20.8 % (ref 12.3–15.4)
FLUAV SUBTYP SPEC NAA+PROBE: NOT DETECTED
FLUBV RNA ISLT QL NAA+PROBE: NOT DETECTED
GFR SERPL CREATININE-BSD FRML MDRD: 54 ML/MIN/1.73
GLOBULIN UR ELPH-MCNC: 3.1 GM/DL
GLUCOSE SERPL-MCNC: 118 MG/DL (ref 65–99)
HCT VFR BLD AUTO: 28.6 % (ref 34–46.6)
HGB BLD-MCNC: 7.9 G/DL (ref 12–15.9)
HYPOCHROMIA BLD QL: NORMAL
IMM GRANULOCYTES # BLD AUTO: 0.02 10*3/MM3 (ref 0–0.05)
IMM GRANULOCYTES NFR BLD AUTO: 0.2 % (ref 0–0.5)
LYMPHOCYTES # BLD AUTO: 2.41 10*3/MM3 (ref 0.7–3.1)
LYMPHOCYTES NFR BLD AUTO: 27.3 % (ref 19.6–45.3)
MCH RBC QN AUTO: 23 PG (ref 26.6–33)
MCHC RBC AUTO-ENTMCNC: 27.6 G/DL (ref 31.5–35.7)
MCV RBC AUTO: 83.1 FL (ref 79–97)
MONOCYTES # BLD AUTO: 0.78 10*3/MM3 (ref 0.1–0.9)
MONOCYTES NFR BLD AUTO: 8.8 % (ref 5–12)
NEUTROPHILS NFR BLD AUTO: 5.2 10*3/MM3 (ref 1.7–7)
NEUTROPHILS NFR BLD AUTO: 58.8 % (ref 42.7–76)
NRBC BLD AUTO-RTO: 0 /100 WBC (ref 0–0.2)
NT-PROBNP SERPL-MCNC: 906.8 PG/ML (ref 0–1800)
PLAT MORPH BLD: NORMAL
PLATELET # BLD AUTO: 447 10*3/MM3 (ref 140–450)
PMV BLD AUTO: 10 FL (ref 6–12)
POTASSIUM SERPL-SCNC: 4.3 MMOL/L (ref 3.5–5.2)
PROT SERPL-MCNC: 6.6 G/DL (ref 6–8.5)
RBC # BLD AUTO: 3.44 10*6/MM3 (ref 3.77–5.28)
SARS-COV-2 RNA PNL SPEC NAA+PROBE: NOT DETECTED
SODIUM SERPL-SCNC: 143 MMOL/L (ref 136–145)
TROPONIN T SERPL-MCNC: <0.01 NG/ML (ref 0–0.03)
TROPONIN T SERPL-MCNC: <0.01 NG/ML (ref 0–0.03)
WBC # BLD AUTO: 8.84 10*3/MM3 (ref 3.4–10.8)

## 2021-07-26 PROCEDURE — 85025 COMPLETE CBC W/AUTO DIFF WBC: CPT | Performed by: PHYSICIAN ASSISTANT

## 2021-07-26 PROCEDURE — 93010 ELECTROCARDIOGRAM REPORT: CPT | Performed by: INTERNAL MEDICINE

## 2021-07-26 PROCEDURE — 94640 AIRWAY INHALATION TREATMENT: CPT

## 2021-07-26 PROCEDURE — 85007 BL SMEAR W/DIFF WBC COUNT: CPT | Performed by: PHYSICIAN ASSISTANT

## 2021-07-26 PROCEDURE — 71045 X-RAY EXAM CHEST 1 VIEW: CPT | Performed by: RADIOLOGY

## 2021-07-26 PROCEDURE — 99283 EMERGENCY DEPT VISIT LOW MDM: CPT

## 2021-07-26 PROCEDURE — 36415 COLL VENOUS BLD VENIPUNCTURE: CPT

## 2021-07-26 PROCEDURE — 80053 COMPREHEN METABOLIC PANEL: CPT | Performed by: PHYSICIAN ASSISTANT

## 2021-07-26 PROCEDURE — 84484 ASSAY OF TROPONIN QUANT: CPT | Performed by: PHYSICIAN ASSISTANT

## 2021-07-26 PROCEDURE — 87636 SARSCOV2 & INF A&B AMP PRB: CPT

## 2021-07-26 PROCEDURE — 94799 UNLISTED PULMONARY SVC/PX: CPT

## 2021-07-26 PROCEDURE — 71045 X-RAY EXAM CHEST 1 VIEW: CPT

## 2021-07-26 PROCEDURE — 83880 ASSAY OF NATRIURETIC PEPTIDE: CPT | Performed by: PHYSICIAN ASSISTANT

## 2021-07-26 PROCEDURE — 93005 ELECTROCARDIOGRAM TRACING: CPT | Performed by: EMERGENCY MEDICINE

## 2021-07-26 RX ORDER — IPRATROPIUM BROMIDE AND ALBUTEROL SULFATE 2.5; .5 MG/3ML; MG/3ML
3 SOLUTION RESPIRATORY (INHALATION) ONCE
Status: COMPLETED | OUTPATIENT
Start: 2021-07-26 | End: 2021-07-26

## 2021-07-26 RX ORDER — SODIUM CHLORIDE 0.9 % (FLUSH) 0.9 %
10 SYRINGE (ML) INJECTION AS NEEDED
Status: DISCONTINUED | OUTPATIENT
Start: 2021-07-26 | End: 2021-07-26 | Stop reason: HOSPADM

## 2021-07-26 RX ADMIN — IPRATROPIUM BROMIDE AND ALBUTEROL SULFATE 3 ML: .5; 3 SOLUTION RESPIRATORY (INHALATION) at 19:30

## 2021-07-26 NOTE — TELEPHONE ENCOUNTER
"Spoke with patient and she stated she was still having SOB and and swelling .  She states both are worse in the evening.  Patient states she her urine ouput is okay in the am but is not good after 2 pm .  She also states that her temp has been around 100.8 and her swelling is different -as in \"swelling like bags in the backs of her legs\".    Patient advised with these continuing symptoms along with now the additional fever and mentioned congestion at LOV that she should go to ER  "

## 2021-07-27 LAB
QT INTERVAL: 442 MS
QTC INTERVAL: 480 MS

## 2021-07-29 ENCOUNTER — APPOINTMENT (OUTPATIENT)
Dept: CARDIOLOGY | Facility: HOSPITAL | Age: 76
End: 2021-07-29

## 2021-07-30 ENCOUNTER — APPOINTMENT (OUTPATIENT)
Dept: CARDIOLOGY | Facility: HOSPITAL | Age: 76
End: 2021-07-30

## 2021-07-30 ENCOUNTER — PATIENT OUTREACH (OUTPATIENT)
Dept: CASE MANAGEMENT | Facility: OTHER | Age: 76
End: 2021-07-30

## 2021-08-06 ENCOUNTER — PATIENT OUTREACH (OUTPATIENT)
Dept: CASE MANAGEMENT | Facility: OTHER | Age: 76
End: 2021-08-06

## 2021-08-06 NOTE — OUTREACH NOTE
Ambulatory Case Management Note    Patient Outreach    Patient followed up with PCP 8/3/21 and had a repeat check of her hemoglobin; it was 7.3. Patient reports she was advised to continue with oral iron supplementation and has repeat labs ordered for Monday. Patient's next apt with Dr. Aguilera is 8/17/21. Patient verbalizes understanding of risks associated with anemia; pt has known liver disease with cirrhosis and intestinal malabsorption per chart review. Referral to GI has been made by primary provider's office.       Barb Ocampo RN  Ambulatory Case Management    8/6/2021, 12:51 EDT

## 2021-08-19 ENCOUNTER — PATIENT OUTREACH (OUTPATIENT)
Dept: CASE MANAGEMENT | Facility: OTHER | Age: 76
End: 2021-08-19

## 2021-08-19 NOTE — OUTREACH NOTE
Ambulatory Case Management Note    Patient Outreach    Patient reports she has been doing OK, states allergies are bad today. Patient noted to sound congested, ACM asked about current interventions being utilized; patient reports she cannot tolerate OTC allergy medications as it causes tachycardia for her. ACM provided education on non-medicinal allergy management including frequent changing of filters, dusting, using cool mist vaporizer at night, camphor oil essential oils in a diffuser, and increasing water intake; pt voiced understanding, will try some of these. Pt recently completed her AWV; care gaps updated in health maintenance. Patient's hemoglobin rechecked and increased to 7.9; pt continues to follow with her PCP for management of chronic anemia. Pt reports her a1c was checked via FS in her provider's office last month and was 7. Patient reports continued stable blood sugars at home. Patient has a scheduled follow up with her PCP, feels well managed and denies current questions, concerns or unmet needs at this time. ACM encouraged pt to call as needed for HRCM support, pt v/u.         Barb Ocampo RN  Ambulatory Case Management    8/19/2021, 13:47 EDT

## 2021-08-31 ENCOUNTER — HOSPITAL ENCOUNTER (OUTPATIENT)
Dept: MAMMOGRAPHY | Facility: HOSPITAL | Age: 76
Discharge: HOME | End: 2021-08-31
Admitting: FAMILY MEDICINE

## 2021-08-31 DIAGNOSIS — Z12.31 VISIT FOR SCREENING MAMMOGRAM: ICD-10-CM

## 2021-08-31 PROCEDURE — 77067 SCR MAMMO BI INCL CAD: CPT

## 2021-08-31 PROCEDURE — 77067 SCR MAMMO BI INCL CAD: CPT | Performed by: RADIOLOGY

## 2021-08-31 PROCEDURE — 77063 BREAST TOMOSYNTHESIS BI: CPT | Performed by: RADIOLOGY

## 2021-08-31 PROCEDURE — 77063 BREAST TOMOSYNTHESIS BI: CPT

## 2021-09-07 ENCOUNTER — HOSPITAL ENCOUNTER (OUTPATIENT)
Dept: CARDIOLOGY | Facility: HOSPITAL | Age: 76
Discharge: HOME | End: 2021-09-07
Admitting: NURSE PRACTITIONER

## 2021-09-07 DIAGNOSIS — R60.0 BILATERAL LOWER EXTREMITY EDEMA: ICD-10-CM

## 2021-09-07 DIAGNOSIS — R06.02 SHORTNESS OF BREATH: ICD-10-CM

## 2021-09-07 PROCEDURE — 93306 TTE W/DOPPLER COMPLETE: CPT | Performed by: INTERNAL MEDICINE

## 2021-09-07 PROCEDURE — 93306 TTE W/DOPPLER COMPLETE: CPT

## 2021-09-08 LAB
BH CV ECHO MEAS - % IVS THICK: 2.1 %
BH CV ECHO MEAS - % LVPW THICK: 33.6 %
BH CV ECHO MEAS - ACS: 2.1 CM
BH CV ECHO MEAS - AI DEC SLOPE: 234 CM/SEC^2
BH CV ECHO MEAS - AI MAX PG: 57.2 MMHG
BH CV ECHO MEAS - AI MAX VEL: 378 CM/SEC
BH CV ECHO MEAS - AI P1/2T: 473.1 MSEC
BH CV ECHO MEAS - AO MAX PG: 10.1 MMHG
BH CV ECHO MEAS - AO MEAN PG: 6 MMHG
BH CV ECHO MEAS - AO ROOT AREA (BSA CORRECTED): 1.4
BH CV ECHO MEAS - AO ROOT AREA: 5.7 CM^2
BH CV ECHO MEAS - AO ROOT DIAM: 2.7 CM
BH CV ECHO MEAS - AO V2 MAX: 159 CM/SEC
BH CV ECHO MEAS - AO V2 MEAN: 123 CM/SEC
BH CV ECHO MEAS - AO V2 VTI: 40.2 CM
BH CV ECHO MEAS - BSA(HAYCOCK): 2 M^2
BH CV ECHO MEAS - BSA: 1.9 M^2
BH CV ECHO MEAS - BZI_BMI: 35.5 KILOGRAMS/M^2
BH CV ECHO MEAS - BZI_METRIC_HEIGHT: 157.5 CM
BH CV ECHO MEAS - BZI_METRIC_WEIGHT: 88 KG
BH CV ECHO MEAS - EDV(CUBED): 80.6 ML
BH CV ECHO MEAS - EDV(MOD-SP4): 36.6 ML
BH CV ECHO MEAS - EDV(TEICH): 84 ML
BH CV ECHO MEAS - EF(CUBED): 73.4 %
BH CV ECHO MEAS - EF(MOD-SP4): 62.6 %
BH CV ECHO MEAS - EF(TEICH): 65.4 %
BH CV ECHO MEAS - ESV(CUBED): 21.5 ML
BH CV ECHO MEAS - ESV(MOD-SP4): 13.7 ML
BH CV ECHO MEAS - ESV(TEICH): 29 ML
BH CV ECHO MEAS - FS: 35.6 %
BH CV ECHO MEAS - IVS/LVPW: 0.84
BH CV ECHO MEAS - IVSD: 1.1 CM
BH CV ECHO MEAS - IVSS: 1.1 CM
BH CV ECHO MEAS - LA DIMENSION: 3.6 CM
BH CV ECHO MEAS - LA/AO: 1.3
BH CV ECHO MEAS - LV DIASTOLIC VOL/BSA (35-75): 19.4 ML/M^2
BH CV ECHO MEAS - LV MASS(C)D: 181.2 GRAMS
BH CV ECHO MEAS - LV MASS(C)DI: 96 GRAMS/M^2
BH CV ECHO MEAS - LV MASS(C)S: 128.1 GRAMS
BH CV ECHO MEAS - LV MASS(C)SI: 67.9 GRAMS/M^2
BH CV ECHO MEAS - LV SYSTOLIC VOL/BSA (12-30): 7.3 ML/M^2
BH CV ECHO MEAS - LVIDD: 4.3 CM
BH CV ECHO MEAS - LVIDS: 2.8 CM
BH CV ECHO MEAS - LVLD AP4: 6 CM
BH CV ECHO MEAS - LVLS AP4: 5.2 CM
BH CV ECHO MEAS - LVOT AREA (M): 2.5 CM^2
BH CV ECHO MEAS - LVOT AREA: 2.5 CM^2
BH CV ECHO MEAS - LVOT DIAM: 1.8 CM
BH CV ECHO MEAS - LVPWD: 1.3 CM
BH CV ECHO MEAS - LVPWS: 1.7 CM
BH CV ECHO MEAS - MV A MAX VEL: 51.4 CM/SEC
BH CV ECHO MEAS - MV E MAX VEL: 142 CM/SEC
BH CV ECHO MEAS - MV E/A: 2.8
BH CV ECHO MEAS - PA ACC TIME: 0.05 SEC
BH CV ECHO MEAS - PA PR(ACCEL): 55.2 MMHG
BH CV ECHO MEAS - RAP SYSTOLE: 10 MMHG
BH CV ECHO MEAS - RVSP: 55.2 MMHG
BH CV ECHO MEAS - SI(AO): 122 ML/M^2
BH CV ECHO MEAS - SI(CUBED): 31.3 ML/M^2
BH CV ECHO MEAS - SI(MOD-SP4): 12.1 ML/M^2
BH CV ECHO MEAS - SI(TEICH): 29.1 ML/M^2
BH CV ECHO MEAS - SV(AO): 230.2 ML
BH CV ECHO MEAS - SV(CUBED): 59.1 ML
BH CV ECHO MEAS - SV(MOD-SP4): 22.9 ML
BH CV ECHO MEAS - SV(TEICH): 54.9 ML
BH CV ECHO MEAS - TR MAX VEL: 336 CM/SEC
MAXIMAL PREDICTED HEART RATE: 145 BPM
STRESS TARGET HR: 123 BPM

## 2021-09-12 DIAGNOSIS — R93.89 ABNORMAL CAROTID ULTRASOUND: ICD-10-CM

## 2021-09-13 RX ORDER — ATORVASTATIN CALCIUM 40 MG/1
TABLET, FILM COATED ORAL
Qty: 90 TABLET | Refills: 1 | Status: SHIPPED | OUTPATIENT
Start: 2021-09-13 | End: 2021-11-30 | Stop reason: SDUPTHER

## 2021-11-03 ENCOUNTER — TRANSCRIBE ORDERS (OUTPATIENT)
Dept: ADMINISTRATIVE | Facility: HOSPITAL | Age: 76
End: 2021-11-03

## 2021-11-03 DIAGNOSIS — D11.0 RIGHT PAROTID ADENOMA: Primary | ICD-10-CM

## 2021-11-24 ENCOUNTER — APPOINTMENT (OUTPATIENT)
Dept: CT IMAGING | Facility: HOSPITAL | Age: 76
End: 2021-11-24

## 2021-11-30 ENCOUNTER — OFFICE VISIT (OUTPATIENT)
Dept: CARDIOLOGY | Facility: CLINIC | Age: 76
End: 2021-11-30

## 2021-11-30 VITALS
DIASTOLIC BLOOD PRESSURE: 54 MMHG | SYSTOLIC BLOOD PRESSURE: 130 MMHG | HEART RATE: 69 BPM | HEIGHT: 62 IN | WEIGHT: 170.2 LBS | TEMPERATURE: 97.5 F | BODY MASS INDEX: 31.32 KG/M2

## 2021-11-30 DIAGNOSIS — E78.2 MIXED HYPERLIPIDEMIA: ICD-10-CM

## 2021-11-30 DIAGNOSIS — I10 ESSENTIAL HYPERTENSION: Primary | ICD-10-CM

## 2021-11-30 DIAGNOSIS — I35.1 NONRHEUMATIC AORTIC VALVE REGURGITATION: ICD-10-CM

## 2021-11-30 DIAGNOSIS — R93.89 ABNORMAL CAROTID ULTRASOUND: ICD-10-CM

## 2021-11-30 DIAGNOSIS — R06.02 SHORTNESS OF BREATH: ICD-10-CM

## 2021-11-30 DIAGNOSIS — F17.210 CIGARETTE SMOKER: ICD-10-CM

## 2021-11-30 PROCEDURE — 93000 ELECTROCARDIOGRAM COMPLETE: CPT | Performed by: SPECIALIST

## 2021-11-30 PROCEDURE — 99214 OFFICE O/P EST MOD 30 MIN: CPT | Performed by: SPECIALIST

## 2021-11-30 RX ORDER — HYDROCHLOROTHIAZIDE 25 MG/1
25 TABLET ORAL DAILY
Qty: 90 TABLET | Refills: 1 | Status: SHIPPED | OUTPATIENT
Start: 2021-11-30 | End: 2022-10-21

## 2021-11-30 RX ORDER — AMLODIPINE BESYLATE 5 MG/1
5 TABLET ORAL DAILY
Qty: 90 TABLET | Refills: 1 | Status: SHIPPED | OUTPATIENT
Start: 2021-11-30 | End: 2022-05-31

## 2021-11-30 RX ORDER — POTASSIUM CHLORIDE 750 MG/1
10 TABLET, FILM COATED, EXTENDED RELEASE ORAL DAILY
Qty: 90 TABLET | Refills: 1 | Status: SHIPPED | OUTPATIENT
Start: 2021-11-30 | End: 2022-10-27

## 2021-11-30 RX ORDER — LISINOPRIL 20 MG/1
20 TABLET ORAL DAILY
Qty: 90 TABLET | Refills: 1 | Status: SHIPPED | OUTPATIENT
Start: 2021-11-30

## 2021-11-30 RX ORDER — ATORVASTATIN CALCIUM 40 MG/1
40 TABLET, FILM COATED ORAL DAILY
Qty: 90 TABLET | Refills: 1 | Status: SHIPPED | OUTPATIENT
Start: 2021-11-30 | End: 2022-10-21

## 2021-11-30 RX ORDER — METOPROLOL SUCCINATE 50 MG/1
50 TABLET, EXTENDED RELEASE ORAL DAILY
Qty: 90 TABLET | Refills: 1 | Status: SHIPPED | OUTPATIENT
Start: 2021-11-30

## 2021-11-30 RX ORDER — FUROSEMIDE 20 MG/1
20 TABLET ORAL DAILY
Qty: 90 TABLET | Refills: 1 | Status: SHIPPED | OUTPATIENT
Start: 2021-11-30 | End: 2022-05-31

## 2021-11-30 NOTE — PROGRESS NOTES
Subjective   Follow up, shortness of breath  Carlos Salazar is a 76 y.o. female who presents to day for Follow-up (6 month follow up , echo).    CHIEF COMPLIANT  Chief Complaint   Patient presents with   • Follow-up     6 month follow up , echo       Active Problems:  Problem List Items Addressed This Visit        Cardiac and Vasculature    Essential hypertension - Primary    Relevant Medications    metoprolol succinate XL (TOPROL-XL) 50 MG 24 hr tablet    hydroCHLOROthiazide (HYDRODIURIL) 25 MG tablet    furosemide (LASIX) 20 MG tablet    potassium chloride 10 MEQ CR tablet    amLODIPine (NORVASC) 5 MG tablet    lisinopril (PRINIVIL,ZESTRIL) 20 MG tablet    Mixed hyperlipidemia    Relevant Medications    atorvastatin (LIPITOR) 40 MG tablet    Abnormal carotid ultrasound    Relevant Medications    atorvastatin (LIPITOR) 40 MG tablet    Nonrheumatic aortic valve regurgitation    Relevant Medications    metoprolol succinate XL (TOPROL-XL) 50 MG 24 hr tablet    amLODIPine (NORVASC) 5 MG tablet       Pulmonary and Pneumonias    Shortness of breath       Tobacco    Cigarette smoker          HPI  HPI  Better her edema has almost resolved with no chest pain she continues have shortness of breath with mild exertion class III using oxygen 24 hours no chest pain no palpitation  PRIOR MEDS  Current Outpatient Medications on File Prior to Visit   Medication Sig Dispense Refill   • albuterol sulfate  (90 Base) MCG/ACT inhaler Inhale 2 puffs Every 4 (Four) Hours As Needed for Wheezing.     • aspirin 81 MG EC tablet Take 81 mg by mouth Daily.     • budesonide-formoterol (SYMBICORT) 160-4.5 MCG/ACT inhaler Inhale 2 puffs 2 (Two) Times a Day.     • cyclobenzaprine (FLEXERIL) 10 MG tablet Take 10 mg by mouth 2 (Two) Times a Day As Needed for muscle spasms.     • docusate calcium (SURFAK) 240 MG capsule Take 1 capsule by mouth Daily. 30 capsule 0   • Fe Bisgly-Vit C-Vit B12-FA (GENTLE IRON PO) Take  by mouth.     • gabapentin  (NEURONTIN) 300 MG capsule Take 400 mg by mouth 3 (Three) Times a Day.     • HYDROcodone-acetaminophen (NORCO) 7.5-325 MG per tablet Take 1 tablet by mouth 2 (Two) Times a Day.     • insulin glargine (LANTUS) 100 UNIT/ML injection Inject 60 Units under the skin Every Night.     • metFORMIN (GLUCOPHAGE) 1000 MG tablet Take 1,000 mg by mouth 2 (Two) Times a Day With Meals.     • montelukast (SINGULAIR) 10 MG tablet Take 10 mg by mouth Daily.     • nystatin (MYCOSTATIN) 504449 UNIT/ML suspension Swish and swallow 500,000 Units 3 (Three) Times a Day.     • ofloxacin (FLOXIN) 0.3 % otic solution 5 drops Daily.     • omeprazole (PriLOSEC) 20 MG capsule Take 20 mg by mouth Daily.     • Probiotic Product (PROBIOTIC ADVANCED PO) Take  by mouth.     • vitamin B-12 (CYANOCOBALAMIN) 500 MCG tablet Take 500 mcg by mouth Daily.     • zolpidem (AMBIEN) 10 MG tablet Take 10 mg by mouth At Night As Needed for sleep.     • [DISCONTINUED] amLODIPine (NORVASC) 5 MG tablet Take 5 mg by mouth Daily.     • [DISCONTINUED] atorvastatin (LIPITOR) 40 MG tablet TAKE ONE TABLET BY MOUTH DAILY 90 tablet 1   • [DISCONTINUED] furosemide (LASIX) 20 MG tablet Take 20 mg by mouth Daily.     • [DISCONTINUED] hydrochlorothiazide (HYDRODIURIL) 25 MG tablet Take 25 mg by mouth Daily.     • [DISCONTINUED] lisinopril (PRINIVIL,ZESTRIL) 20 MG tablet Take 20 mg by mouth Daily.     • [DISCONTINUED] metoprolol succinate XL (TOPROL-XL) 50 MG 24 hr tablet Take 1 tablet by mouth 2 (Two) Times a Day. 30 tablet 0   • [DISCONTINUED] potassium chloride (K-DUR) 10 MEQ CR tablet Take 10 mEq by mouth Daily.       No current facility-administered medications on file prior to visit.       ALLERGIES  Clonidine derivatives    HISTORY  Past Medical History:   Diagnosis Date   • Anemia    • COPD (chronic obstructive pulmonary disease) (HCC)    • Diabetes mellitus (HCC)    • Ear infection    • GERD (gastroesophageal reflux disease)    • Hypertension    • Myocardial infarction  "(HCC)    • Neuropathy in diabetes (HCC)    • Osteoarthritis        Social History     Socioeconomic History   • Marital status:    Tobacco Use   • Smoking status: Current Every Day Smoker     Packs/day: 0.50     Years: 50.00     Pack years: 25.00     Types: Cigarettes   • Smokeless tobacco: Never Used   Substance and Sexual Activity   • Alcohol use: No   • Drug use: No   • Sexual activity: Defer       Family History   Problem Relation Age of Onset   • Diabetes Mother    • Hypertension Mother    • Diabetes Maternal Grandmother    • Diabetes Maternal Grandfather    • Breast cancer Neg Hx        Review of Systems   Respiratory: Positive for shortness of breath. Negative for apnea, cough, choking, chest tightness, wheezing and stridor.    Cardiovascular: Positive for leg swelling. Negative for chest pain and palpitations.       Objective     VITALS: /54   Pulse 69   Temp 97.5 °F (36.4 °C)   Ht 157.5 cm (62.01\")   Wt 77.2 kg (170 lb 3.2 oz)   BMI 31.12 kg/m²     LABS:   Lab Results (most recent)     None          IMAGING:   Mammo Screening Digital Tomosynthesis Bilateral With CAD    Result Date: 9/1/2021  No findings suspicious for malignancy.  BI-RADS CATEGORY:  2, BENIGN  RECOMMENDATION: Yearly mammogram, yearly clinical breast exam, and encourage self breast awareness.  CAD was used.  The standard false negative rate of mammography is between 10% and 25%. Complex patterns or increased breast density will markedly elevate the false negative rate of mammography.  A letter, in lay terminology, with the results of this exam will be mailed to the patient.   If there is a palpable area of concern, biopsy should be considered regardless of imaging findings.  This report was finalized on 9/1/2021 10:48 AM by Dr. Katarzyna Nicole MD.        EXAM:  Physical Exam  Vitals reviewed.   Constitutional:       Appearance: She is well-developed.   HENT:      Head: Normocephalic and atraumatic.   Eyes:      Pupils: " Pupils are equal, round, and reactive to light.   Neck:      Thyroid: No thyromegaly.      Vascular: No JVD.      Comments: Left carotid bruid  Cardiovascular:      Rate and Rhythm: Normal rate and regular rhythm.      Heart sounds: Normal heart sounds. No murmur heard.  No friction rub. No gallop.    Pulmonary:      Effort: Pulmonary effort is normal. No respiratory distress.      Breath sounds: No stridor. Wheezing present. No rales.   Chest:      Chest wall: No tenderness.   Musculoskeletal:         General: No tenderness or deformity.      Cervical back: Neck supple.   Skin:     General: Skin is warm and dry.   Neurological:      Mental Status: She is alert and oriented to person, place, and time.      Cranial Nerves: No cranial nerve deficit.      Coordination: Coordination normal.         Procedure     ECG 12 Lead    Date/Time: 11/30/2021 11:02 AM  Performed by: Alec Agrawal MD  Authorized by: Alec Agrawal MD           EKG: Normal sinus rhythm with anteroseptal infarction pattern which is seen on the previous EKG on 7/27/2021 otherwise no significant change       Assessment/Plan     Diagnoses and all orders for this visit:    1. Essential hypertension (Primary)  -     metoprolol succinate XL (TOPROL-XL) 50 MG 24 hr tablet; Take 1 tablet by mouth Daily.  Dispense: 90 tablet; Refill: 1  -     hydroCHLOROthiazide (HYDRODIURIL) 25 MG tablet; Take 1 tablet by mouth Daily.  Dispense: 90 tablet; Refill: 1  -     furosemide (LASIX) 20 MG tablet; Take 1 tablet by mouth Daily.  Dispense: 90 tablet; Refill: 1  -     potassium chloride 10 MEQ CR tablet; Take 1 tablet by mouth Daily.  Dispense: 90 tablet; Refill: 1  -     amLODIPine (NORVASC) 5 MG tablet; Take 1 tablet by mouth Daily.  Dispense: 90 tablet; Refill: 1  -     lisinopril (PRINIVIL,ZESTRIL) 20 MG tablet; Take 1 tablet by mouth Daily.  Dispense: 90 tablet; Refill: 1    2. Nonrheumatic aortic valve regurgitation    3. Mixed hyperlipidemia    4. Abnormal  carotid ultrasound  -     atorvastatin (LIPITOR) 40 MG tablet; Take 1 tablet by mouth Daily.  Dispense: 90 tablet; Refill: 1    5. Shortness of breath    6. Cigarette smoker    Other orders  -     ECG 12 Lead    1.  Her blood pressure is well controlled she lost 24 pounds of weight we will continue current medications  2.  We discussed the results of the echocardiogram with aortic valve regurgitation and with significant form hypertension PA pressure about 55 mmHg there was a concern regarding mitral valve calcification I reviewed the echo myself and I think these are calcified chordae I discussed with her BHARGAV but I am concerned about her underlying pulmonary status and we both agreed to just keep monitoring this  3.  I reviewed the abdominal aortic ultrasound with she did not show any aortic aneurysm  4.  Was stressed the importance of quitting smoking she is actually almost quitting now she is still smokes maybe 1 or 2 cigarettes a day max no way for quitting completely  5.  Her knee surgery has been postponed it because her lung status  6.  I reviewed the labs with decent lipid profile from July and elevated fasting glucose    Return in about 6 months (around 5/30/2022).             MEDS ORDERED DURING VISIT:  New Medications Ordered This Visit   Medications   • metoprolol succinate XL (TOPROL-XL) 50 MG 24 hr tablet     Sig: Take 1 tablet by mouth Daily.     Dispense:  90 tablet     Refill:  1   • atorvastatin (LIPITOR) 40 MG tablet     Sig: Take 1 tablet by mouth Daily.     Dispense:  90 tablet     Refill:  1   • hydroCHLOROthiazide (HYDRODIURIL) 25 MG tablet     Sig: Take 1 tablet by mouth Daily.     Dispense:  90 tablet     Refill:  1   • furosemide (LASIX) 20 MG tablet     Sig: Take 1 tablet by mouth Daily.     Dispense:  90 tablet     Refill:  1   • potassium chloride 10 MEQ CR tablet     Sig: Take 1 tablet by mouth Daily.     Dispense:  90 tablet     Refill:  1   • amLODIPine (NORVASC) 5 MG tablet     Sig:  Take 1 tablet by mouth Daily.     Dispense:  90 tablet     Refill:  1   • lisinopril (PRINIVIL,ZESTRIL) 20 MG tablet     Sig: Take 1 tablet by mouth Daily.     Dispense:  90 tablet     Refill:  1       As always, Martin Aguilera MD  I appreciate very much the opportunity to participate in the cardiovascular care of your patients. Please do not hesitate to call me with any questions with regards to Carlos Salazar evaluation and management.         This document has been electronically signed by Alec Agrawal MD  November 30, 2021 12:08 EST

## 2022-01-24 ENCOUNTER — HOSPITAL ENCOUNTER (OUTPATIENT)
Dept: CT IMAGING | Facility: HOSPITAL | Age: 77
Discharge: HOME | End: 2022-01-24
Admitting: FAMILY MEDICINE

## 2022-01-24 DIAGNOSIS — D11.0 RIGHT PAROTID ADENOMA: ICD-10-CM

## 2022-01-24 PROCEDURE — 70486 CT MAXILLOFACIAL W/O DYE: CPT

## 2022-01-24 PROCEDURE — 70486 CT MAXILLOFACIAL W/O DYE: CPT | Performed by: RADIOLOGY

## 2022-05-29 DIAGNOSIS — I10 ESSENTIAL HYPERTENSION: ICD-10-CM

## 2022-05-31 RX ORDER — AMLODIPINE BESYLATE 5 MG/1
TABLET ORAL
Qty: 90 TABLET | Refills: 1 | Status: SHIPPED | OUTPATIENT
Start: 2022-05-31 | End: 2022-11-28

## 2022-05-31 RX ORDER — FUROSEMIDE 20 MG/1
TABLET ORAL
Qty: 90 TABLET | Refills: 1 | Status: SHIPPED | OUTPATIENT
Start: 2022-05-31 | End: 2022-11-29

## 2022-10-21 DIAGNOSIS — R93.89 ABNORMAL CAROTID ULTRASOUND: ICD-10-CM

## 2022-10-21 DIAGNOSIS — I10 ESSENTIAL HYPERTENSION: ICD-10-CM

## 2022-10-21 RX ORDER — ATORVASTATIN CALCIUM 40 MG/1
TABLET, FILM COATED ORAL
Qty: 90 TABLET | Refills: 0 | Status: SHIPPED | OUTPATIENT
Start: 2022-10-21

## 2022-10-21 RX ORDER — HYDROCHLOROTHIAZIDE 25 MG/1
TABLET ORAL
Qty: 90 TABLET | Refills: 0 | Status: SHIPPED | OUTPATIENT
Start: 2022-10-21 | End: 2022-12-14

## 2022-10-22 DIAGNOSIS — I10 ESSENTIAL HYPERTENSION: ICD-10-CM

## 2022-10-27 RX ORDER — POTASSIUM CHLORIDE 750 MG/1
TABLET, FILM COATED, EXTENDED RELEASE ORAL
Qty: 90 TABLET | Refills: 1 | Status: SHIPPED | OUTPATIENT
Start: 2022-10-27

## 2022-11-25 DIAGNOSIS — I10 ESSENTIAL HYPERTENSION: ICD-10-CM

## 2022-11-28 RX ORDER — AMLODIPINE BESYLATE 5 MG/1
TABLET ORAL
Qty: 90 TABLET | Refills: 1 | Status: SHIPPED | OUTPATIENT
Start: 2022-11-28

## 2022-11-29 DIAGNOSIS — I10 ESSENTIAL HYPERTENSION: ICD-10-CM

## 2022-11-29 RX ORDER — FUROSEMIDE 20 MG/1
TABLET ORAL
Qty: 90 TABLET | Refills: 1 | Status: SHIPPED | OUTPATIENT
Start: 2022-11-29

## 2022-12-14 DIAGNOSIS — I10 ESSENTIAL HYPERTENSION: ICD-10-CM

## 2022-12-14 RX ORDER — HYDROCHLOROTHIAZIDE 25 MG/1
TABLET ORAL
Qty: 90 TABLET | Refills: 0 | Status: SHIPPED | OUTPATIENT
Start: 2022-12-14

## 2023-04-06 ENCOUNTER — TRANSCRIBE ORDERS (OUTPATIENT)
Dept: ADMINISTRATIVE | Facility: HOSPITAL | Age: 78
End: 2023-04-06
Payer: MEDICARE

## 2023-04-06 DIAGNOSIS — K11.8 PAROTID MASS: Primary | ICD-10-CM

## 2023-04-26 ENCOUNTER — HOSPITAL ENCOUNTER (OUTPATIENT)
Dept: CT IMAGING | Facility: HOSPITAL | Age: 78
Discharge: HOME | End: 2023-04-26
Payer: MEDICARE

## 2023-04-26 DIAGNOSIS — K11.8 PAROTID MASS: ICD-10-CM

## 2023-04-26 PROCEDURE — 70490 CT SOFT TISSUE NECK W/O DYE: CPT | Performed by: RADIOLOGY

## 2023-04-26 PROCEDURE — 70490 CT SOFT TISSUE NECK W/O DYE: CPT

## 2023-04-26 PROCEDURE — 82565 ASSAY OF CREATININE: CPT

## 2023-04-27 LAB — CREAT BLDA-MCNC: 2.1 MG/DL (ref 0.6–1.3)

## 2023-05-15 DIAGNOSIS — R93.89 ABNORMAL CAROTID ULTRASOUND: ICD-10-CM

## 2023-05-15 RX ORDER — ATORVASTATIN CALCIUM 40 MG/1
TABLET, FILM COATED ORAL
Qty: 90 TABLET | Refills: 0 | OUTPATIENT
Start: 2023-05-15

## 2023-05-24 DIAGNOSIS — I10 ESSENTIAL HYPERTENSION: ICD-10-CM

## 2023-05-24 RX ORDER — AMLODIPINE BESYLATE 5 MG/1
TABLET ORAL
Qty: 90 TABLET | Refills: 1 | OUTPATIENT
Start: 2023-05-24

## 2023-05-26 DIAGNOSIS — I10 ESSENTIAL HYPERTENSION: ICD-10-CM

## 2023-05-26 RX ORDER — FUROSEMIDE 20 MG/1
TABLET ORAL
Qty: 90 TABLET | Refills: 1 | OUTPATIENT
Start: 2023-05-26

## 2023-07-11 ENCOUNTER — TELEPHONE (OUTPATIENT)
Dept: ONCOLOGY | Facility: CLINIC | Age: 78
End: 2023-07-11

## 2023-07-11 NOTE — TELEPHONE ENCOUNTER
Caller: Carlos Salazar    Relationship to patient: Self    Best call back number: [phone]    Chief complaint: PATIENT NEEDS TO RESCHEDULE    Type of visit: LAB AND FOLLOW UP      If rescheduling, when is the original appointment: 7-20-23

## 2023-07-20 ENCOUNTER — TRANSCRIBE ORDERS (OUTPATIENT)
Dept: ADMINISTRATIVE | Facility: HOSPITAL | Age: 78
End: 2023-07-20
Payer: MEDICARE

## 2023-07-20 DIAGNOSIS — N18.32 CHRONIC KIDNEY DISEASE (CKD) STAGE G3B/A1, MODERATELY DECREASED GLOMERULAR FILTRATION RATE (GFR) BETWEEN 30-44 ML/MIN/1.73 SQUARE METER AND ALBUMINURIA CREATININE RATIO LESS THAN 30 MG/G (CMS/H*: Primary | ICD-10-CM

## 2023-08-09 ENCOUNTER — HOSPITAL ENCOUNTER (OUTPATIENT)
Dept: ULTRASOUND IMAGING | Facility: HOSPITAL | Age: 78
Discharge: HOME | End: 2023-08-09
Admitting: INTERNAL MEDICINE
Payer: MEDICARE

## 2023-08-09 DIAGNOSIS — N18.32 CHRONIC KIDNEY DISEASE (CKD) STAGE G3B/A1, MODERATELY DECREASED GLOMERULAR FILTRATION RATE (GFR) BETWEEN 30-44 ML/MIN/1.73 SQUARE METER AND ALBUMINURIA CREATININE RATIO LESS THAN 30 MG/G (CMS/H*: ICD-10-CM

## 2023-08-09 PROCEDURE — 76775 US EXAM ABDO BACK WALL LIM: CPT | Performed by: RADIOLOGY

## 2023-08-09 PROCEDURE — 76775 US EXAM ABDO BACK WALL LIM: CPT

## 2023-10-10 ENCOUNTER — OFFICE VISIT (OUTPATIENT)
Dept: ONCOLOGY | Facility: CLINIC | Age: 78
End: 2023-10-10
Payer: MEDICARE

## 2023-10-10 ENCOUNTER — LAB (OUTPATIENT)
Dept: ONCOLOGY | Facility: CLINIC | Age: 78
End: 2023-10-10
Payer: MEDICARE

## 2023-10-10 VITALS
HEART RATE: 65 BPM | OXYGEN SATURATION: 96 % | TEMPERATURE: 97.4 F | WEIGHT: 169.2 LBS | DIASTOLIC BLOOD PRESSURE: 47 MMHG | BODY MASS INDEX: 31.95 KG/M2 | RESPIRATION RATE: 18 BRPM | SYSTOLIC BLOOD PRESSURE: 157 MMHG | HEIGHT: 61 IN

## 2023-10-10 DIAGNOSIS — D50.9 IRON DEFICIENCY ANEMIA, UNSPECIFIED IRON DEFICIENCY ANEMIA TYPE: ICD-10-CM

## 2023-10-10 DIAGNOSIS — D64.9 NORMOCYTIC ANEMIA: Primary | ICD-10-CM

## 2023-10-10 DIAGNOSIS — N18.9 CHRONIC KIDNEY DISEASE, UNSPECIFIED CKD STAGE: ICD-10-CM

## 2023-10-10 DIAGNOSIS — K74.60 CIRRHOSIS OF LIVER WITHOUT ASCITES, UNSPECIFIED HEPATIC CIRRHOSIS TYPE: ICD-10-CM

## 2023-10-10 DIAGNOSIS — D64.9 NORMOCYTIC ANEMIA: ICD-10-CM

## 2023-10-10 LAB
ALBUMIN SERPL-MCNC: 3.3 G/DL (ref 3.5–5.2)
ALBUMIN/GLOB SERPL: 1.1 G/DL
ALP SERPL-CCNC: 115 U/L (ref 39–117)
ALT SERPL W P-5'-P-CCNC: 13 U/L (ref 1–33)
ANION GAP SERPL CALCULATED.3IONS-SCNC: 8.2 MMOL/L (ref 5–15)
AST SERPL-CCNC: 20 U/L (ref 1–32)
BASOPHILS # BLD AUTO: 0.03 10*3/MM3 (ref 0–0.2)
BASOPHILS NFR BLD AUTO: 0.5 % (ref 0–1.5)
BILIRUB SERPL-MCNC: 0.2 MG/DL (ref 0–1.2)
BUN SERPL-MCNC: 18 MG/DL (ref 8–23)
BUN/CREAT SERPL: 13 (ref 7–25)
CALCIUM SPEC-SCNC: 8.6 MG/DL (ref 8.6–10.5)
CHLORIDE SERPL-SCNC: 100 MMOL/L (ref 98–107)
CO2 SERPL-SCNC: 28.8 MMOL/L (ref 22–29)
CREAT SERPL-MCNC: 1.38 MG/DL (ref 0.57–1)
DEPRECATED RDW RBC AUTO: 51.8 FL (ref 37–54)
EGFRCR SERPLBLD CKD-EPI 2021: 39.5 ML/MIN/1.73
EOSINOPHIL # BLD AUTO: 0.11 10*3/MM3 (ref 0–0.4)
EOSINOPHIL NFR BLD AUTO: 1.8 % (ref 0.3–6.2)
ERYTHROCYTE [DISTWIDTH] IN BLOOD BY AUTOMATED COUNT: 15.7 % (ref 12.3–15.4)
FERRITIN SERPL-MCNC: 70.75 NG/ML (ref 13–150)
GLOBULIN UR ELPH-MCNC: 3 GM/DL
GLUCOSE SERPL-MCNC: 326 MG/DL (ref 65–99)
HCT VFR BLD AUTO: 32.3 % (ref 34–46.6)
HGB BLD-MCNC: 10 G/DL (ref 12–15.9)
IMM GRANULOCYTES # BLD AUTO: 0.02 10*3/MM3 (ref 0–0.05)
IMM GRANULOCYTES NFR BLD AUTO: 0.3 % (ref 0–0.5)
IRON 24H UR-MRATE: 160 MCG/DL (ref 37–145)
IRON SATN MFR SERPL: 51 % (ref 20–50)
LYMPHOCYTES # BLD AUTO: 1.04 10*3/MM3 (ref 0.7–3.1)
LYMPHOCYTES NFR BLD AUTO: 16.8 % (ref 19.6–45.3)
MCH RBC QN AUTO: 27.9 PG (ref 26.6–33)
MCHC RBC AUTO-ENTMCNC: 31 G/DL (ref 31.5–35.7)
MCV RBC AUTO: 90 FL (ref 79–97)
MONOCYTES # BLD AUTO: 0.39 10*3/MM3 (ref 0.1–0.9)
MONOCYTES NFR BLD AUTO: 6.3 % (ref 5–12)
NEUTROPHILS NFR BLD AUTO: 4.6 10*3/MM3 (ref 1.7–7)
NEUTROPHILS NFR BLD AUTO: 74.3 % (ref 42.7–76)
NRBC BLD AUTO-RTO: 0 /100 WBC (ref 0–0.2)
PLATELET # BLD AUTO: 354 10*3/MM3 (ref 140–450)
PMV BLD AUTO: 9 FL (ref 6–12)
POTASSIUM SERPL-SCNC: 4.1 MMOL/L (ref 3.5–5.2)
PROT SERPL-MCNC: 6.3 G/DL (ref 6–8.5)
RBC # BLD AUTO: 3.59 10*6/MM3 (ref 3.77–5.28)
SODIUM SERPL-SCNC: 137 MMOL/L (ref 136–145)
TIBC SERPL-MCNC: 313 MCG/DL (ref 298–536)
TRANSFERRIN SERPL-MCNC: 210 MG/DL (ref 200–360)
WBC NRBC COR # BLD: 6.19 10*3/MM3 (ref 3.4–10.8)

## 2023-10-10 PROCEDURE — 83540 ASSAY OF IRON: CPT | Performed by: NURSE PRACTITIONER

## 2023-10-10 PROCEDURE — 82728 ASSAY OF FERRITIN: CPT | Performed by: NURSE PRACTITIONER

## 2023-10-10 PROCEDURE — 84466 ASSAY OF TRANSFERRIN: CPT | Performed by: NURSE PRACTITIONER

## 2023-10-10 PROCEDURE — 85025 COMPLETE CBC W/AUTO DIFF WBC: CPT | Performed by: NURSE PRACTITIONER

## 2023-10-10 PROCEDURE — 80053 COMPREHEN METABOLIC PANEL: CPT | Performed by: NURSE PRACTITIONER

## 2023-10-10 NOTE — PROGRESS NOTES
DATE:  10/10/2023    DIAGNOSIS: Normocytic Anemia    CHIEF COMPLAINT:  Chronic aches/pains      TREATMENT HISTORY:  Ferrous Sulfate 325 mg daily    HISTORY OF PRESENT ILLNESS:   Carlos Salazar is a very pleasant 78 y.o. female who  is being seen today at the request of Martin Aguilera MD for evaluation and treatment of normocytic anemia. Ms. Salazar reports following with her PCP monthly with lab testing as needed. She has been aware of this issue for many years. She reports that she has been taking Ferrous Sulfate 325 mg daily since November 2022. She reports that she is tolerating this well. She reports a history of blood transfusion, she last received one unit PRBCs in 2019. She denies any obvious blood loss from any source. She reports having EGD/colonoscopy per Dr. Guerrero 6 years ago. Endoscopy was unremarkable for bleeding but she did have 2 polyps removed. She reports chronic weakness and fatigue but denies any worsening. She denies headaches, dizziness or chest pain. She has shortness of breath on exertion but relates this to COPD and currently wears supplemental oxygen per nasal cannula. Of note, patient has chronic kidney disease and has an upcoming appointment with Dr. De La Torre next week. Previous available CBCs were reviewed and patient has had chronic normocytic anemia with Hg ranging 7.9-10.9 since at least July 2021. WBC and platelets have been normal. No other CBCs available to review for comparison of trend. She denies any other specific complaints today.      Interval History:  Ms. Slaazar presents today for follow up of anemia. She was hospitalized in early September for sepsis and fracture to left arm after a fall. She has been recovering well from this. She also reports at that time she was found to have cirrhosis (BRADLEY). She continues to take Ferrous Sulfate TID and has been tolerating this well.  She continues to struggle with chronic fatigue but denies any worsening. She denies headaches, dizziness  or chest pain. She reports shortness of breath is at baseline. She denies any obvious blood loss from any source. She reports chronic aches/pains today. She continues to follow with Dr. De La Torre. Otherwise, she is doing well and denies any specific complaints.     The following portions of the patient's history were reviewed and updated as appropriate: allergies, current medications, past family history, past medical history, past social history, past surgical history and problem list.    PAST MEDICAL HISTORY:  Past Medical History:   Diagnosis Date    Anemia     COPD (chronic obstructive pulmonary disease)     Diabetes mellitus     Ear infection     GERD (gastroesophageal reflux disease)     Hypertension     Myocardial infarction     Neuropathy in diabetes     Osteoarthritis        PAST SURGICAL HISTORY:  Past Surgical History:   Procedure Laterality Date    CHOLECYSTECTOMY      HYSTERECTOMY      partial    LAPAROSCOPIC TUBAL LIGATION      vaginally       SOCIAL HISTORY:  Social History     Socioeconomic History    Marital status:    Tobacco Use    Smoking status: Every Day     Packs/day: 2.00     Years: 50.00     Additional pack years: 0.00     Total pack years: 100.00     Types: Cigarettes    Smokeless tobacco: Never   Vaping Use    Vaping Use: Every day    Substances: Nicotine   Substance and Sexual Activity    Alcohol use: No    Drug use: No    Sexual activity: Defer              FAMILY HISTORY:  Family History   Problem Relation Age of Onset    Diabetes Mother     Hypertension Mother     Diabetes Maternal Grandmother     Diabetes Maternal Grandfather     Breast cancer Neg Hx          MEDICATIONS:  The current medication list was reviewed in the EMR    Current Outpatient Medications:     albuterol sulfate  (90 Base) MCG/ACT inhaler, Inhale 2 puffs Every 4 (Four) Hours As Needed for Wheezing., Disp: , Rfl:     amLODIPine (NORVASC) 5 MG tablet, TAKE ONE TABLET BY MOUTH DAILY, Disp: 90 tablet, Rfl: 1     aspirin 81 MG EC tablet, Take 1 tablet by mouth Daily., Disp: , Rfl:     atorvastatin (LIPITOR) 40 MG tablet, TAKE ONE TABLET BY MOUTH DAILY, Disp: 90 tablet, Rfl: 0    budesonide-formoterol (SYMBICORT) 160-4.5 MCG/ACT inhaler, Inhale 2 puffs 2 (Two) Times a Day., Disp: , Rfl:     cyclobenzaprine (FLEXERIL) 10 MG tablet, Take 1 tablet by mouth 2 (Two) Times a Day As Needed for Muscle Spasms., Disp: , Rfl:     docusate calcium (SURFAK) 240 MG capsule, Take 1 capsule by mouth Daily., Disp: 30 capsule, Rfl: 0    Fe Bisgly-Vit C-Vit B12-FA (GENTLE IRON PO), Take  by mouth., Disp: , Rfl:     ferrous sulfate 325 (65 FE) MG tablet, Take 1 tablet by mouth 3 (Three) Times a Day With Meals., Disp: 90 tablet, Rfl: 5    furosemide (LASIX) 20 MG tablet, TAKE ONE TABLET BY MOUTH DAILY, Disp: 90 tablet, Rfl: 1    gabapentin (NEURONTIN) 300 MG capsule, Take 400 mg by mouth 3 (Three) Times a Day., Disp: , Rfl:     hydroCHLOROthiazide (HYDRODIURIL) 25 MG tablet, TAKE ONE TABLET BY MOUTH DAILY, Disp: 90 tablet, Rfl: 0    HYDROcodone-acetaminophen (NORCO) 7.5-325 MG per tablet, Take 1 tablet by mouth 2 (Two) Times a Day., Disp: , Rfl:     insulin glargine (LANTUS) 100 UNIT/ML injection, Inject 60 Units under the skin into the appropriate area as directed Every Night., Disp: , Rfl:     lisinopril (PRINIVIL,ZESTRIL) 20 MG tablet, Take 1 tablet by mouth Daily., Disp: 90 tablet, Rfl: 1    metFORMIN (GLUCOPHAGE) 1000 MG tablet, Take 1 tablet by mouth 2 (Two) Times a Day With Meals., Disp: , Rfl:     metoprolol succinate XL (TOPROL-XL) 50 MG 24 hr tablet, Take 1 tablet by mouth Daily., Disp: 90 tablet, Rfl: 1    montelukast (SINGULAIR) 10 MG tablet, Take 1 tablet by mouth Daily., Disp: , Rfl:     nystatin (MYCOSTATIN) 142345 UNIT/ML suspension, Swish and swallow 5 mL 3 (Three) Times a Day., Disp: , Rfl:     ofloxacin (FLOXIN) 0.3 % otic solution, 5 drops Daily., Disp: , Rfl:     omeprazole (PriLOSEC) 20 MG capsule, Take 1 capsule by mouth  Daily., Disp: , Rfl:     potassium chloride 10 MEQ CR tablet, TAKE ONE TABLET BY MOUTH DAILY, Disp: 90 tablet, Rfl: 1    Probiotic Product (PROBIOTIC ADVANCED PO), Take  by mouth., Disp: , Rfl:     sucralfate (CARAFATE) 1 g tablet, Take 1 tablet by mouth 4 (Four) Times a Day., Disp: , Rfl:     vitamin B-12 (CYANOCOBALAMIN) 500 MCG tablet, Take 1 tablet by mouth Daily., Disp: , Rfl:     zolpidem (AMBIEN) 10 MG tablet, Take 1 tablet by mouth At Night As Needed for Sleep., Disp: , Rfl:     ALLERGIES:    Allergies   Allergen Reactions    Clonidine Derivatives Shortness Of Breath         REVIEW OF SYSTEMS:    A comprehensive 14 point review of systems was performed.  Significant findings as mentioned above.  All other systems reviewed and are negative.        Physical Exam   Vital Signs:   Vitals:    10/10/23 1011   BP: 157/47   Pulse: 65   Resp: 18   Temp: 97.4 øF (36.3 øC)   SpO2: 96%        General: Well developed, well nourished, alert and oriented x 3, in no acute distress.   Head: ATNC   Eyes: PERRL, No evidence of conjunctivitis.   Nose: No nasal discharge. Nasal cannula in place.  Mouth: Oral mucosal membranes moist. No oral ulceration or hemorrhages.   Neck: Neck supple. No thyromegaly. No JVD.   Lungs: Clear in all fields to A&P without rales, rhonchi or wheezing.   Heart: S1, S2. Regular rate and rhythm. No murmurs, rubs, or gallops.   Abdomen: Soft. Bowel sounds are normoactive. Nontender with palpation.   Extremities: No clubbing, cyanosis or edema bilaterally.   Integumentary: Warm, dry, intact.   Neurologic: Grossly non-focal exam.    Pain Score:  Pain Score    10/10/23 1011   PainSc:   8   PainLoc: Generalized         PHQ-Score Total:  PHQ-9 Total Score:        PATHOLOGY:        ENDOSCOPY:        IMAGING:    CT Abdomen Pelvis With Contrast (09/07/2023 14:01)   Impression:  1. No acute vascular pathology within the chest abdomen or pelvis.  2. There is diffuse reticular nodular opacification in the right  upper lobe and in the bilateral lower lobes. This appearance is suggestive of an infectious process. There is focal partial collapse of the right lower lobe adjacent to a small to moderate sized right pleural fluid collection.  3. There are several borderline sized lymph nodes in the mediastinum and hilum. These may be reactive  4. Surface nodularity of the liver and enlarged left lobe are compatible with cirrhosis and underlying hepatocellular disease.  5. No acute traumatic injury is seen throughout the abdomen or pelvis.  6. No acute fracture of the bony pelvis  7. No acute fracture or malalignment of the cervical spine.  8. No acute fracture or malalignment of the thoracic spine.  9. No acute fracture or malalignment of the lumbar spine.       RECENT LABS:  Lab Results   Component Value Date    WBC 6.19 10/10/2023    HGB 10.0 (L) 10/10/2023    HCT 32.3 (L) 10/10/2023    MCV 90.0 10/10/2023    RDW 15.7 (H) 10/10/2023     10/10/2023    NEUTRORELPCT 74.3 10/10/2023    LYMPHORELPCT 16.8 (L) 10/10/2023    MONORELPCT 6.3 10/10/2023    EOSRELPCT 1.8 10/10/2023    BASORELPCT 0.5 10/10/2023    NEUTROABS 4.60 10/10/2023    LYMPHSABS 1.04 10/10/2023       Lab Results   Component Value Date     10/10/2023    K 4.1 10/10/2023    CO2 28.8 10/10/2023     10/10/2023    BUN 18 10/10/2023    CREATININE 1.38 (H) 10/10/2023    EGFRIFNONA 54 (L) 07/26/2021    GLUCOSE 326 (H) 10/10/2023    CALCIUM 8.6 10/10/2023    ALKPHOS 115 10/10/2023    AST 20 10/10/2023    ALT 13 10/10/2023    BILITOT 0.2 10/10/2023    ALBUMIN 3.3 (L) 10/10/2023    PROTEINTOT 6.3 10/10/2023    MG 2.0 12/01/2016       Lab Results   Component Value Date     (H) 06/23/2023       Lab Results   Component Value Date    FERRITIN 70.75 10/10/2023    IRON 160 (H) 10/10/2023    TIBC 313 10/10/2023    LABIRON 51 (H) 10/10/2023    PIFPLWAL45 1,015 (H) 06/23/2023    FOLATE >20.00 06/23/2023    HAPTOGLOBIN 290 (H) 06/23/2023    RETICCTPCT 2.55 (H)  06/23/2023    RETIC 0.0854 06/23/2023      Work Up 06/23/2023  Lab Results   Component Value Date    CRP 1.88 (H) 06/23/2023     Lab Results   Component Value Date    SEDRATE 76 (H) 06/23/2023     Lab Results   Component Value Date    COPPER 139 06/23/2023     Lab Results   Component Value Date    ZINC 75 06/23/2023     Tissue Transglutaminase IgA  0 - 3 U/mL <2     TSH  0.270 - 4.200 uIU/mL 0.613       ASSESSMENT & PLAN:  Carlos Salazar is a very pleasant 78 y.o. female with    1.  Normocytic Anemia  - Previous available CBCs were reviewed and patient has had chronic normocytic anemia with Hg ranging 7.9-10.9 since at least July 2021. WBC and platelets have been normal. No other CBCs available to review for comparison of trend.  - Denies any obvious blood loss from any source. She reports EGD/colonoscopy per Dr. Guerrero six years ago and had 2 polyps removed (no records currently available, will request records today).   - She is taking Ferrous Sulfate 325 mg daily since November 2022 and has been tolerating this well.   - CBC from initial consultation showed Hg (9.3) with normal WBC and platelets. Iron panel and Ferritin were borderline low. Also sent additional labs to further evaluate anemia including PBS which showed normocytic, hypochromic anemia, no schistocyte population identified, normal total WBC count and differential, no granulocytic dysplasia or blasts identified, adequate platelets. B12 and Folate were replete. No evidence of hemolysis as Retic, LDH and Haptoglobin were elevated. CRP and ESR were elevated and suggestive of chronic underlying inflammation. TSH, Copper, Zinc and Tissue Transglutaminase were normal. SPEP/SHAE did not reveal M-spike and SFLC showed normal ratio.  - Repeat CBC from today shows Hg (10.0) with normal WBC and platelets. Creatinine has improved significantly (1.38). Iron panel is elevated  and Ferritin was normal. Advised to decrease oral Ferrous Sulfate to daily.  - Discussed  referral to GI for possible EGD/colonoscopy and management of cirrhosis and she is agreeable. Referral placed today.   - Will follow up in 3 months with repeat labs.      2. CKD  - Creatinine (1.38), significantly improved. Ongoing management per Dr. De La Torre.    3. BRADLEY Cirrhosis  - Recently noted on CT AP (see above). This could also be contributing to normocytic anemia. Recommended referral to GI for ongoing management and she was agreeable. Referral placed as above.         A total of 30 minutes were spent coordinating this patient's care in clinic today; more than 50% of this time was face-to-face with the patient, reviewing her interim medical history and counseling on the current treatment and followup plan. All questions were answered to her satisfaction.          Electronically Signed by: CLARENCE Brar , CLARENCE October 10, 2023 12:03 EDT       CC:   No ref. provider found  Martin Aguilera MD

## 2023-10-10 NOTE — PROGRESS NOTES
Venipuncture Blood Specimen Collection  Venipuncture performed in left arm by Sari Callahan MA with good hemostasis. Patient tolerated the procedure well without complications.   10/10/23   Sari Callahan MA